# Patient Record
Sex: MALE | Race: WHITE | Employment: UNEMPLOYED | URBAN - NONMETROPOLITAN AREA
[De-identification: names, ages, dates, MRNs, and addresses within clinical notes are randomized per-mention and may not be internally consistent; named-entity substitution may affect disease eponyms.]

---

## 2021-11-30 ENCOUNTER — CLINICAL DOCUMENTATION (OUTPATIENT)
Dept: INTERNAL MEDICINE CLINIC | Age: 31
End: 2021-11-30

## 2021-11-30 ENCOUNTER — OFFICE VISIT (OUTPATIENT)
Dept: INTERNAL MEDICINE CLINIC | Age: 31
End: 2021-11-30

## 2021-11-30 VITALS
DIASTOLIC BLOOD PRESSURE: 82 MMHG | HEART RATE: 91 BPM | SYSTOLIC BLOOD PRESSURE: 137 MMHG | TEMPERATURE: 97.2 F | WEIGHT: 151 LBS

## 2021-11-30 DIAGNOSIS — F11.20 SEVERE OPIOID USE DISORDER (HCC): Primary | ICD-10-CM

## 2021-11-30 DIAGNOSIS — F11.93 OPIOID WITHDRAWAL (HCC): ICD-10-CM

## 2021-11-30 DIAGNOSIS — F19.10 POLYSUBSTANCE ABUSE (HCC): ICD-10-CM

## 2021-11-30 LAB
ALCOHOL URINE: ABNORMAL
AMPHETAMINE SCREEN, URINE: ABNORMAL
BARBITURATE SCREEN, URINE: ABNORMAL
BENZODIAZEPINE SCREEN, URINE: ABNORMAL
BUPRENORPHINE URINE: ABNORMAL
COCAINE METABOLITE SCREEN URINE: ABNORMAL
FENTANYL SCREEN, URINE: ABNORMAL
GABAPENTIN SCREEN, URINE: ABNORMAL
MDMA URINE: ABNORMAL
METHADONE SCREEN, URINE: ABNORMAL
METHAMPHETAMINE, URINE: ABNORMAL
OPIATE SCREEN URINE: ABNORMAL
OXYCODONE SCREEN URINE: ABNORMAL
PHENCYCLIDINE SCREEN URINE: ABNORMAL
PROPOXYPHENE SCREEN, URINE: ABNORMAL
SYNTHETIC CANNABINOIDS(K2) SCREEN, URINE: ABNORMAL
THC SCREEN, URINE: ABNORMAL
TRAMADOL SCREEN URINE: ABNORMAL
TRICYCLIC ANTIDEPRESSANTS, UR: ABNORMAL

## 2021-11-30 PROCEDURE — 99205 OFFICE O/P NEW HI 60 MIN: CPT | Performed by: INTERNAL MEDICINE

## 2021-11-30 PROCEDURE — 80305 DRUG TEST PRSMV DIR OPT OBS: CPT | Performed by: INTERNAL MEDICINE

## 2021-11-30 RX ORDER — BUPRENORPHINE AND NALOXONE 8; 2 MG/1; MG/1
1 FILM, SOLUBLE BUCCAL; SUBLINGUAL ONCE
Status: COMPLETED | OUTPATIENT
Start: 2021-11-30 | End: 2021-11-30

## 2021-11-30 RX ADMIN — BUPRENORPHINE AND NALOXONE 1 FILM: 8; 2 FILM, SOLUBLE BUCCAL; SUBLINGUAL at 10:42

## 2021-11-30 NOTE — PROGRESS NOTES
Clinician engaged with pt and reviewed OBOT clinic expectations for program participants. Clinician explained that upon starting with the program the patient will be expected to engage in individual and group counseling to enhance their recovery skills. Clinician reviewed the treatment menu with patient and identified local providers that they are comfortable seeing for counseling services. Provided pt with information about accessing services at their agency of choice. Clinician explained that upon completing their first counseling visit they need to bring a copy of a signed IBETH that allows the counselor to release attendance records to the clinic and a copy of their treatment plan. Clinician explained that they need to bring a signed form verifying their attendance after each session with their counselor so that it can be uploaded into their chart. It was explained to the pt that they need to attend either treatment groups at a local agency where they receive counseling or attend a minimum of two community support groups per week and would need to bring a signed verification form. Pt was provided with a list of local 12 step meetings and the attendance verification form. Clinician explained the drug screen policy and informed them that their provider may request that they come in for a random drug screen or medication count. In the event that they are called for a random check, they will have 24hrs to come into the office. In the event that pt is not compliant with program expectations the frequency of their visits may be increased for added accountability, they may be asked to participate in a higher level of care, or they may be terminated from the treatment program.     Clinician allowed time for pt questions and had them sign the program expectations form and clinician signed as the witness.  A copy was made for patient to present to the counselor that they schedule with so that the pt can get the necessary documentation for the clinic. The form was given to support staff to be scanned into the pt chart. Patient met with Sw today. Patient identifies that he does have his own apartment in [place]. Patient reports having a job lined up and will start next week. Patient reports that he does prefer at this time to stay with Dr. Jatin Bennett. Patient is interested in the [employer] danelle and will bring phone with him at next appointment to set up. Sw offered to put a list of AA NA meetings in his area together for him along with counseling options. Patient reports that he will be able to obtain insurance through work in 80 days. Sw will meet with patient at next visit.

## 2021-11-30 NOTE — PROGRESS NOTES
MEDICATION ASSISTED TREATMENT ENCOUNTER    HISTORY OF PRESENT ILLNESS  Patient presents for evaluation of opioid use disorder and wants to be placed on medication assisted treatment  Patient found us online  Patient has had problems using heroin and fentanyl  Patient started using heroin and fentanyl 11 years ago  He started getting pain pills done in [place] then progressed to heroin  Patient uses it intravenous  Other drugs used: Meth, THC  Suboxone programs in the past he was in one in [facility] and one in [place]  He was clean for about 2 and half years combined but he did relapse in between  Vivitrol in the past for about a month  Last use of heroin about a year ago in group home  He went to group home December 2019 just got out 3 days ago he said he was using Suboxone in group home  About 2 days ago he took Percocet ,handful of them  Last use of Suboxone was in group home November 26  Patient would typically use up to 3 g daily fentanyl    Have you ever overdosed 4 times, one time he had to be given Narcan  No past medical history on file. No past surgical history on file. PAST PSYCHIATRIC HISTORY: Social anxiety, depression    No Known Allergies    No current outpatient medications on file. No current facility-administered medications for this visit. 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     Mental status examination    Cognition: oriented to person place and time      Appearance: Patient is in no acute distress, normal appearance, patient does not appear intoxicated/    Memory: Normal    Behavior/motor: Normal    Mood: Good mood, upbeat    Affect: Mood congruent    Attitude toward examiner: Pleasant, respectful    Thought content no delusions hallucinations suicidal ideation    Insight: poor    Judgment: poor    Eyes: Pupils normal    Skin: No track marks noted ,no rashes noted    COWS score: 13                      URINE DRUG SCREEN TODAY:  Recent Labs     11/30/21  0921   ALCOHOL NEG   LABAMPH POS   LABBARB NEG   LABBENZ NEG   BUPRENUR POS   COCAIMETSCRU NEG   FENTSCRUR POS   GABAPENTIN N/A   MDMA NEG   METAMPU POS   LABMETH NEG   OPIATESCREENURINE NEG   OXTCOSU POS   PHENCYCLIDINESCREENURINE NEG   PROPOXYPHENE N/A   SPICEUR NEG   THCSCREENUR NEG   TRAMADOLUR NEG   TRICYUR N/A           Diagnosis Orders   1. Severe opioid use disorder (HCC)  POCT Rapid Drug Screen    buprenorphine-naloxone (SUBOXONE) 8-2 MG SL film 1 Film   2. Polysubstance abuse ([facility]     3. [facility]io[facility] withdrawal ([facility]           PLAN:  Provider provided education on Medication Assisted Treatment options, including: Suboxone, Sublocade, Methadone, and Naltrexone/Vivitrol  Allowed opportunity to respond to questions regarding the treatment options. Patient voices that their treatment preference is suboxone. I feel that this patient is an appropriate candidate for this treatment option. Education given on the importance of combining Medication Assisted Treatment with comprehensive treatment, including: individual counseling, treatment groups, community support groups, and psychiatry as applicable. Patient will meet with  to review clinic counseling expectations and to be linked to appropriate services. Provider reviewed medication contract with patient. Patient is agreeable to the program expectations. Both patient and provider signed the medication contract.      Patient instructed to go to [facility] to watch a video and learn about [facility]. I told patient [facility] is an opioid antagonist that reverses respiratory depression caused by opioids. Pharmacy will give patient or family member [facility] and explain how to use in an emergency.   I reviewed the [place] Automated Rx Reporting System report     There does not appear to be any discrepancies or overprescribing of controlled substances  Urine has multiple substances including fentanyl  He is in moderate withdrawal  I think it is safe to do a Suboxone induction here in the office  Patient was given 8 mg Suboxone monitored for 20 minutes  There is no precipitated withdrawal  I am going to have the patient see Lamont Puga about counseling and the recovery danelle reset  We will give him 2-8 mg strips out of stock of Suboxone  He will come back here tomorrow      Total visit time: 45 minutes

## 2021-11-30 NOTE — PROGRESS NOTES
Verbal order per Dr. Dafne Baxter for urine drug screen. Positive for BUP AMP METH FTY OXY. Verified results with Josselin Crenshaw. Dr. Dafne Baxter ordered Suboxone 8mg film qty 1 for induction purpose- no adverse reactions noted for patient. Verified dose with patient. Patient was sent home with Suboxone 8mg film qty 2 out of stock and will be seen back in the office 12/1/21.

## 2021-11-30 NOTE — PROGRESS NOTES
Patient contacted office  On 11/29/2021  to complete new patient screening. Patient is currently living in [place] though currently has [place]. Patient identifies that he is unable to remain clean for 30 days without MAT treatment. Patient reports having reliable transportation. Sw staffed case with Dr. Agustina Hale. Sw will meet with patient to assist while in office with setting up services closer to him. Patient scheduled for 11/30/2021.

## 2021-12-01 ENCOUNTER — OFFICE VISIT (OUTPATIENT)
Dept: INTERNAL MEDICINE CLINIC | Age: 31
End: 2021-12-01

## 2021-12-01 VITALS
SYSTOLIC BLOOD PRESSURE: 142 MMHG | DIASTOLIC BLOOD PRESSURE: 81 MMHG | WEIGHT: 152 LBS | TEMPERATURE: 98.1 F | HEART RATE: 73 BPM

## 2021-12-01 DIAGNOSIS — Z79.899 ENCOUNTER FOR MONITORING SUBOXONE MAINTENANCE THERAPY: ICD-10-CM

## 2021-12-01 DIAGNOSIS — Z51.81 ENCOUNTER FOR MONITORING SUBOXONE MAINTENANCE THERAPY: ICD-10-CM

## 2021-12-01 DIAGNOSIS — F11.20 SEVERE OPIOID USE DISORDER (HCC): Primary | ICD-10-CM

## 2021-12-01 DIAGNOSIS — F19.10 POLYSUBSTANCE ABUSE (HCC): ICD-10-CM

## 2021-12-01 PROCEDURE — 80305 DRUG TEST PRSMV DIR OPT OBS: CPT | Performed by: INTERNAL MEDICINE

## 2021-12-01 PROCEDURE — 99214 OFFICE O/P EST MOD 30 MIN: CPT | Performed by: INTERNAL MEDICINE

## 2021-12-01 RX ORDER — BUPRENORPHINE AND NALOXONE 8; 2 MG/1; MG/1
1 FILM, SOLUBLE BUCCAL; SUBLINGUAL 2 TIMES DAILY
Qty: 10 FILM | Refills: 0 | Status: SHIPPED | OUTPATIENT
Start: 2021-12-01 | End: 2021-12-06 | Stop reason: SDUPTHER

## 2021-12-01 RX ORDER — BUPRENORPHINE AND NALOXONE 8; 2 MG/1; MG/1
1 FILM, SOLUBLE BUCCAL; SUBLINGUAL 2 TIMES DAILY
COMMUNITY
End: 2021-12-01 | Stop reason: SDUPTHER

## 2021-12-01 NOTE — PROGRESS NOTES
Verbal order per Dr. Jaqueline Latham for urine drug screen. Positive for BUP METH OXY. Verified results with Armaan Fitzgerald. Dr. Jaqueline Latham ordered Suboxone 8mg film BID for patient. Verified dose with patient. Patient was sent home with 5 day script of Suboxone 8mg film BID and will be seen back in the office 12/6/21.

## 2021-12-01 NOTE — PROGRESS NOTES
MEDICATION ASSISTED TREATMENT ENCOUNTER    HISTORY OF PRESENT ILLNESS  Patient presents for evaluation of opioid use disorder and wants to be placed on medication assisted treatment  Patient found us online  I saw him here for the first time 11/30  We did a Suboxone induction here in the office  He was given 8 mg and monitored for 20 minutes  There were no complications, no precipitated withdrawal  I gave him 2-8 mg Suboxone to go home with  He used 1 last night 1 this morning  He says he feels much better  Pertinent drug history  Patient has had problems using heroin and fentanyl  Patient started using heroin and fentanyl 11 years ago  He started getting pain pills done in [facility] then progressed to heroin  Patient uses it intravenous  Other drugs used: Meth, THC  Suboxone programs in the past he was in one in [facility] and one in [facility]  He was clean for about 2 and half years combined but he did relapse in between  Vivitrol in the past for about a month  Last use of heroin about a year ago in detention  He went to detention December 2019 just got out 3 days ago he said he was using Suboxone in detention  About 4 days ago he took Percocet ,handful of them  Last use of Suboxone was in detention November 26  Patient would typically use up to 3 g daily fentanyl    Have you ever overdosed 4 times, one time he had to be given Narcan  No past medical history on file. No past surgical history on file. PAST PSYCHIATRIC HISTORY: Social anxiety, depression    No Known Allergies    Current Outpatient Medications   Medication Sig Dispense Refill    buprenorphine-naloxone (SUBOXONE) 8-2 MG FILM SL film Place 1 Film under the tongue 2 times daily for 5 days. 10 Film 0     No current facility-administered medications for this visit.          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 Mental status examination    Cognition: oriented to person place and time      Appearance: Patient is in no acute distress, normal appearance, patient does not appear intoxicated/    Memory: Normal    Behavior/motor: Normal    Mood: Good mood, upbeat    Affect: Mood congruent    Attitude toward examiner: Pleasant, respectful    Thought content no delusions hallucinations suicidal ideation    Insight: poor    Judgment: poor    Eyes: Pupils normal    Skin: No track marks noted ,no rashes noted                          URINE DRUG SCREEN TODAY:  Recent Labs     12/01/21  0943   ALCOHOL NEG   LABAMPH NEG   LABBARB NEG   LABBENZ NEG   BUPRENUR POS   COCAIMETSCRU NEG   FENTSCRUR NEG   GABAPENTIN N/A   MDMA NEG   METAMPU POS   LABMETH NEG   OPIATESCREENURINE NEG   OXTCOSU POS   PHENCYCLIDINESCREENURINE NEG   PROPOXYPHENE N/A   SPICEUR NEG   THCSCREENUR NEG   TRAMADOLUR NEG   TRICYUR N/A           Diagnosis Orders   1. Severe opioid use disorder (HCC)  POCT Rapid Drug Screen    buprenorphine-naloxone (SUBOXONE) 8-2 MG FILM SL film   2. Polysubstance abuse ([facility]     3.  Encounter for monitoring Suboxone maintenance therapy           PLAN:  Urine still has meth, OxyContin  Patient says he feels much better  I think 8 mg twice daily Suboxone will be his maintenance dose  I am going to see him back on 12/6  I discussed a new phone danelle dealing with substance use disorder with the patient  It is called reSet  It is a 24/7 hour system that the patient can use at any time  There are lesson plans, tracking of treatment, rewards, etc  I told the patient's I will also have access to their account to track their progress  Patient will discuss with Shaun Adan and sign up if interested  She will also discussed counseling and meetings with the patient  Need to check hep C status has he used IV drugs

## 2021-12-01 NOTE — PROGRESS NOTES
Patient signed up for ReSET-O today. Refill request per protocol.   Rx(s) sent via e-prescribing to the pharmacy.

## 2021-12-06 ENCOUNTER — OFFICE VISIT (OUTPATIENT)
Dept: INTERNAL MEDICINE CLINIC | Age: 31
End: 2021-12-06

## 2021-12-06 VITALS
SYSTOLIC BLOOD PRESSURE: 134 MMHG | HEART RATE: 115 BPM | TEMPERATURE: 97.1 F | WEIGHT: 153 LBS | DIASTOLIC BLOOD PRESSURE: 85 MMHG

## 2021-12-06 DIAGNOSIS — Z79.899 ENCOUNTER FOR MONITORING SUBOXONE MAINTENANCE THERAPY: ICD-10-CM

## 2021-12-06 DIAGNOSIS — F19.10 POLYSUBSTANCE ABUSE (HCC): ICD-10-CM

## 2021-12-06 DIAGNOSIS — Z51.81 ENCOUNTER FOR MONITORING SUBOXONE MAINTENANCE THERAPY: ICD-10-CM

## 2021-12-06 DIAGNOSIS — F11.20 SEVERE OPIOID USE DISORDER (HCC): Primary | ICD-10-CM

## 2021-12-06 PROCEDURE — 80305 DRUG TEST PRSMV DIR OPT OBS: CPT | Performed by: INTERNAL MEDICINE

## 2021-12-06 PROCEDURE — 99214 OFFICE O/P EST MOD 30 MIN: CPT | Performed by: INTERNAL MEDICINE

## 2021-12-06 RX ORDER — BUPRENORPHINE AND NALOXONE 8; 2 MG/1; MG/1
1 FILM, SOLUBLE BUCCAL; SUBLINGUAL 2 TIMES DAILY
Qty: 14 FILM | Refills: 0 | Status: SHIPPED | OUTPATIENT
Start: 2021-12-06 | End: 2021-12-13 | Stop reason: SDUPTHER

## 2021-12-06 NOTE — PROGRESS NOTES
Verbal order per Dr. Severiano Spence for urine drug screen. Positive for BUP AMP METH MDMA FTY THC . Verified results with Nicolás Grounds. Dr. Severiano Spence ordered Suboxone 8mg film BID  for patient. Verified dose with patient. Patient was sent home with 1 week script of Suboxone 8mg film BID and will be seen back in the office 12/13/21. UC and oral swab sent.

## 2021-12-06 NOTE — PROGRESS NOTES
MEDICATION ASSISTED TREATMENT ENCOUNTER    HISTORY OF PRESENT ILLNESS  Patient presents for evaluation of opioid use disorder and wants to be placed on medication assisted treatment  Patient found us online  I saw him here for the first time 12/1  Patient said a couple nights he took extra he only had a half of a strip left for this morning  He did not relapse  We did a Suboxone induction here in the office 11/30    Pertinent drug history  Patient has had problems using heroin and fentanyl  Patient started using heroin and fentanyl 11 years ago  He started getting pain pills done in [place] then progressed to heroin  Patient uses it intravenous  Other drugs used: Meth, THC  Suboxone programs in the past he was in one in [facility] and one in [place]  He was clean for about 2 and half years combined but he did relapse in between  Vivitrol in the past for about a month  Last use of heroin about a year ago in nursing home  He went to nursing home December 2019 just got out 3 days ago he said he was using Suboxone in nursing home  About 4 days ago he took Percocet ,handful of them  Last use of Suboxone was in nursing home November 26  Patient would typically use up to 3 g daily fentanyl    Have you ever overdosed 4 times, one time he had to be given Narcan  No past medical history on file. No past surgical history on file. PAST PSYCHIATRIC HISTORY: Social anxiety, depression    No Known Allergies    Current Outpatient Medications   Medication Sig Dispense Refill    buprenorphine-naloxone (SUBOXONE) 8-2 MG FILM SL film Place 1 Film under the tongue 2 times daily for 7 days. 14 Film 0     No current facility-administered medications for this visit.          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 Mental status examination    Cognition: oriented to person place and time      Appearance: Patient is in no acute distress, normal appearance, patient does not appear intoxicated/    Memory: Normal    Behavior/motor: Normal    Mood: Good mood, upbeat    Affect: Mood congruent    Attitude toward examiner: Pleasant, respectful    Thought content no delusions hallucinations suicidal ideation    Insight: poor    Judgment: poor    Eyes: Pupils normal    Skin: No track marks noted ,no rashes noted                          URINE DRUG SCREEN TODAY:    Component 12/6/21 1411   Alcohol, Urine NEG    Amphetamine Screen, Urine POS    Barbiturate Screen, Urine NEG    Benzodiazepine Screen, Urine NEG    Buprenorphine Urine POS    Cocaine Metabolite Screen, Urine NEG    FENTANYL SCREEN, URINE POS    Gabapentin Screen, Urine N/A    MDMA, Urine POS    Methadone Screen, Urine NEG    Methamphetamine, Urine POS    Opiate Scrn, Ur NEG    Oxycodone Screen, Ur NEG    PCP Screen, Urine NEG    Propoxyphene Screen, Urine N/A    Synthetic Cannabinoids (K2) Screen, Urine NEG    THC Screen, Urine POS    Tramadol Scrn, Ur NEG    Tricyclic Antidepressants, Urine N/A         Diagnosis Orders   1. Severe opioid use disorder (HCC)  buprenorphine-naloxone (SUBOXONE) 8-2 MG FILM SL film    POCT Rapid Drug Screen   2. Polysubstance abuse ([facility] [address]     3.  Encounter for monitoring Suboxone maintenance therapy           PLAN:  Urine still has meth, fentanyl and amphetamines  Patient says he feels much better  I think 8 mg twice daily Suboxone will be his maintenance dose  I am going to see him back in 1 week  I    Need to check hep C status as he used IV drugs  I reviewed the [place] Automated [facility] Reporting [facility] report     There does not appear to be any discrepancies or overprescribing of controlled substances

## 2021-12-13 ENCOUNTER — OFFICE VISIT (OUTPATIENT)
Dept: INTERNAL MEDICINE CLINIC | Age: 31
End: 2021-12-13

## 2021-12-13 VITALS
WEIGHT: 160 LBS | DIASTOLIC BLOOD PRESSURE: 85 MMHG | SYSTOLIC BLOOD PRESSURE: 141 MMHG | TEMPERATURE: 97.3 F | HEART RATE: 93 BPM | HEIGHT: 71 IN | BODY MASS INDEX: 22.4 KG/M2

## 2021-12-13 DIAGNOSIS — B18.2 HEP C W/O COMA, CHRONIC (HCC): ICD-10-CM

## 2021-12-13 DIAGNOSIS — Z79.899 ENCOUNTER FOR MONITORING SUBOXONE MAINTENANCE THERAPY: ICD-10-CM

## 2021-12-13 DIAGNOSIS — F19.10 POLYSUBSTANCE ABUSE (HCC): ICD-10-CM

## 2021-12-13 DIAGNOSIS — Z51.81 ENCOUNTER FOR MONITORING SUBOXONE MAINTENANCE THERAPY: ICD-10-CM

## 2021-12-13 DIAGNOSIS — F11.20 SEVERE OPIOID USE DISORDER (HCC): Primary | ICD-10-CM

## 2021-12-13 PROCEDURE — 99214 OFFICE O/P EST MOD 30 MIN: CPT | Performed by: INTERNAL MEDICINE

## 2021-12-13 PROCEDURE — 80305 DRUG TEST PRSMV DIR OPT OBS: CPT | Performed by: INTERNAL MEDICINE

## 2021-12-13 RX ORDER — BUPRENORPHINE AND NALOXONE 8; 2 MG/1; MG/1
1 FILM, SOLUBLE BUCCAL; SUBLINGUAL 2 TIMES DAILY
Qty: 14 FILM | Refills: 0 | Status: SHIPPED | OUTPATIENT
Start: 2021-12-13 | End: 2021-12-20 | Stop reason: SDUPTHER

## 2021-12-13 NOTE — PROGRESS NOTES
MEDICATION ASSISTED TREATMENT ENCOUNTER    HISTORY OF PRESENT ILLNESS  Patient presents for evaluation of opioid use disorder and wants to be placed on medication assisted treatment  Patient found us online  I saw him here for the first time 12/6    We did a Suboxone induction here in the office 11/30  He is on Suboxone 16 mg a day doing well  Pertinent drug history  Patient has had problems using heroin and fentanyl  Patient started using heroin and fentanyl 11 years ago  He started getting pain pills done in [place] then progressed to heroin  Patient uses it intravenous  Other drugs used: Meth, THC  Suboxone programs in the past he was in one in [facility] and one in [place]  He was clean for about 2 and half years combined but he did relapse in between  Vivitrol in the past for about a month  Last use of heroin about a year ago in intermediate  He went to intermediate December 2019 just got out 3 days ago he said he was using Suboxone in intermediate  About 4 days ago he took Percocet ,handful of them  Last use of Suboxone was in intermediate November 26  Patient would typically use up to 3 g daily fentanyl    Have you ever overdosed 4 times, one time he had to be given Narcan  No past medical history on file. No past surgical history on file. PAST PSYCHIATRIC HISTORY: Social anxiety, depression    No Known Allergies    Current Outpatient Medications   Medication Sig Dispense Refill    buprenorphine-naloxone (SUBOXONE) 8-2 MG FILM SL film Place 1 Film under the tongue 2 times daily for 7 days. 14 Film 0     No current facility-administered medications for this visit.          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 Mental status examination    Cognition: oriented to person place and time      Appearance: Patient is in no acute distress, normal appearance, patient does not appear intoxicated/    Memory: Normal    Behavior/motor: Normal    Mood: Good mood, upbeat    Affect: Mood congruent    Attitude toward examiner: Pleasant, respectful    Thought content no delusions hallucinations suicidal ideation    Insight: poor    Judgment: poor    Eyes: Pupils normal    Skin: No track marks noted ,no rashes noted                          URINE DRUG SCREEN TODAY:  Component 12/13/21 1238   Alcohol, Urine NEG    Amphetamine Screen, Urine NEG    Barbiturate Screen, Urine NEG    Benzodiazepine Screen, Urine NEG    Buprenorphine Urine POS    Cocaine Metabolite Screen, Urine NEG    FENTANYL SCREEN, URINE NEG    Gabapentin Screen, Urine N/A    MDMA, Urine NEG    Methadone Screen, Urine NEG    Methamphetamine, Urine POS    Opiate Scrn, Ur NEG    Oxycodone Screen, Ur NEG    PCP Screen, Urine NEG    Propoxyphene Screen, Urine N/A    Synthetic Cannabinoids (K2) Screen, Urine NEG    THC Screen, Urine POS    Tramadol Scrn, Ur NEG    Tricyclic Antidepressants, Urine N/A           Diagnosis Orders   1. Severe opioid use disorder (HCC)  POCT Rapid Drug Screen    buprenorphine-naloxone (SUBOXONE) 8-2 MG FILM SL film   2. Polysubstance abuse ([facility]     3. Encounter for monitoring Suboxone maintenance therapy     4.  Hep C w/o coma, chronic (HCC)           PLAN:  Urine still has meth, fentanyl and amphetamines are gone  Patient says he feels much better  I think 8 mg twice daily Suboxone will be his maintenance dose  I am going to see him back in 1 week  I  Patient has hep C he is waiting to get insurance coverage before he starts treatment  This should be within 90 days  I reviewed the [facility] report     There does not appear to be any discrepancies or overprescribing of controlled substances  Patient signed up for the recovery danelle, Reset

## 2021-12-15 NOTE — PROGRESS NOTES
Verbal order per Dr Belem Ramirez for urine drug screen. Positive for BUP, METH,THC Verified results with Lelia Saenz. Dr Belem Ramirez ordered patient Suboxone 8 mg film BID. Verified dose with patient. Patient was sent home with 1 week script of Suboxone 8 mg film BID and will be seen back in office on 12/20/21.

## 2021-12-20 ENCOUNTER — OFFICE VISIT (OUTPATIENT)
Dept: INTERNAL MEDICINE CLINIC | Age: 31
End: 2021-12-20

## 2021-12-20 VITALS
TEMPERATURE: 98.7 F | SYSTOLIC BLOOD PRESSURE: 93 MMHG | BODY MASS INDEX: 22.82 KG/M2 | HEIGHT: 71 IN | HEART RATE: 93 BPM | WEIGHT: 163 LBS | DIASTOLIC BLOOD PRESSURE: 60 MMHG

## 2021-12-20 DIAGNOSIS — B18.2 HEP C W/O COMA, CHRONIC (HCC): ICD-10-CM

## 2021-12-20 DIAGNOSIS — Z79.899 ENCOUNTER FOR MONITORING SUBOXONE MAINTENANCE THERAPY: ICD-10-CM

## 2021-12-20 DIAGNOSIS — F19.10 POLYSUBSTANCE ABUSE (HCC): ICD-10-CM

## 2021-12-20 DIAGNOSIS — F11.20 SEVERE OPIOID USE DISORDER (HCC): Primary | ICD-10-CM

## 2021-12-20 DIAGNOSIS — Z51.81 ENCOUNTER FOR MONITORING SUBOXONE MAINTENANCE THERAPY: ICD-10-CM

## 2021-12-20 PROCEDURE — 99214 OFFICE O/P EST MOD 30 MIN: CPT | Performed by: INTERNAL MEDICINE

## 2021-12-20 PROCEDURE — 80305 DRUG TEST PRSMV DIR OPT OBS: CPT | Performed by: INTERNAL MEDICINE

## 2021-12-20 RX ORDER — BUPRENORPHINE AND NALOXONE 8; 2 MG/1; MG/1
1 FILM, SOLUBLE BUCCAL; SUBLINGUAL 2 TIMES DAILY
Qty: 28 FILM | Refills: 0 | Status: SHIPPED | OUTPATIENT
Start: 2021-12-20 | End: 2022-01-03

## 2021-12-20 NOTE — PROGRESS NOTES
Verbal order per Dr. Tyesha Mixon for urine drug screen. Positive for BUP THC. Verified results with Marina Todd. Dr. Tyesha Mixon ordered Suboxone 8mg film BID  for patient. Verified dose with patient. Patient was sent home with 2 week script of Suboxone 8mg film BID and will be seen back in the office 1/3/22.

## 2021-12-20 NOTE — PROGRESS NOTES
MEDICATION ASSISTED TREATMENT ENCOUNTER    HISTORY OF PRESENT ILLNESS  Patient presents for evaluation of opioid use disorder and wants to be placed on medication assisted treatment  Patient found us online  I saw him here for the first time 11/30  I last saw him 12/13  We did a Suboxone induction here in the office 11/30  He is on Suboxone 16 mg a day doing well  Pertinent drug history  Patient has had problems using heroin and fentanyl  Patient started using heroin and fentanyl 11 years ago  He started getting pain pills done in [place] then progressed to heroin  Patient uses it intravenous  Other drugs used: Meth, THC  Suboxone programs in the past he was in one in [facility] and one in [place]  He was clean for about 2 and half years combined but he did relapse in between  Vivitrol in the past for about a month  Last use of heroin about a year ago in intermediate  He went to intermediate December 2019 just got out 3 days ago he said he was using Suboxone in intermediate  About 4 days ago he took Percocet ,handful of them  Last use of Suboxone was in intermediate November 26  Patient would typically use up to 3 g daily fentanyl    Have you ever overdosed 4 times, one time he had to be given Narcan  No past medical history on file. No past surgical history on file. PAST PSYCHIATRIC HISTORY: Social anxiety, depression    No Known Allergies    Current Outpatient Medications   Medication Sig Dispense Refill    buprenorphine-naloxone (SUBOXONE) 8-2 MG FILM SL film Place 1 Film under the tongue 2 times daily for 14 days. 28 Film 0     No current facility-administered medications for this visit.          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 Mental status examination    Cognition: oriented to person place and time      Appearance: Patient is in no acute distress, normal appearance, patient does not appear intoxicated/    Memory: Normal    Behavior/motor: Normal    Mood: Good mood, upbeat    Affect: Mood congruent    Attitude toward examiner: Pleasant, respectful    Thought content no delusions hallucinations suicidal ideation    Insight: poor    Judgment: poor    Eyes: Pupils normal    Skin: No track marks noted ,no rashes noted                          URINE DRUG SCREEN TODAY:  Component 12/20/21 1302   Alcohol, Urine NEG    Amphetamine Screen, Urine NEG    Barbiturate Screen, Urine NEG    Benzodiazepine Screen, Urine NEG    Buprenorphine Urine POS    Cocaine Metabolite Screen, Urine NEG    FENTANYL SCREEN, URINE NEG    Gabapentin Screen, Urine N/A    MDMA, Urine NEG    Methadone Screen, Urine NEG    Methamphetamine, Urine NEG    Opiate Scrn, Ur NEG    Oxycodone Screen, Ur NEG    PCP Screen, Urine NEG    Propoxyphene Screen, Urine N/A    Synthetic Cannabinoids (K2) Screen, Urine NEG    THC Screen, Urine POS    Tramadol Scrn, Ur NEG    Tricyclic Antidepressants, Urine N/A         Diagnosis Orders   1. Severe opioid use disorder (HCC)  POCT Rapid Drug Screen    buprenorphine-naloxone (SUBOXONE) 8-2 MG FILM SL film   2. Polysubstance abuse ([facility] [address]     3. Encounter for monitoring Suboxone maintenance therapy     4.  Hep C w/o coma, chronic (HCC)           PLAN:  Urine has no meth, Patient says he feels much better  I think 8 mg twice daily Suboxone will be his maintenance dose  I am going to see him back in 2 weeks  I  Patient has hep C he is waiting to get insurance coverage before he starts treatment  This should be within 90 days  I reviewed the [facility] report     There does not appear to be any discrepancies or overprescribing of controlled substances  Patient signed up for the recovery danelle, Reset

## 2022-01-03 ENCOUNTER — OFFICE VISIT (OUTPATIENT)
Dept: INTERNAL MEDICINE CLINIC | Age: 32
End: 2022-01-03

## 2022-01-03 VITALS
RESPIRATION RATE: 20 BRPM | HEART RATE: 72 BPM | TEMPERATURE: 98.2 F | WEIGHT: 163.4 LBS | HEIGHT: 71 IN | BODY MASS INDEX: 22.88 KG/M2 | DIASTOLIC BLOOD PRESSURE: 71 MMHG | SYSTOLIC BLOOD PRESSURE: 120 MMHG

## 2022-01-03 DIAGNOSIS — F11.20 SEVERE OPIOID USE DISORDER (HCC): Primary | ICD-10-CM

## 2022-01-03 DIAGNOSIS — B18.2 HEP C W/O COMA, CHRONIC (HCC): ICD-10-CM

## 2022-01-03 DIAGNOSIS — F19.10 POLYSUBSTANCE ABUSE (HCC): ICD-10-CM

## 2022-01-03 DIAGNOSIS — Z51.81 ENCOUNTER FOR MONITORING SUBOXONE MAINTENANCE THERAPY: ICD-10-CM

## 2022-01-03 DIAGNOSIS — Z79.899 ENCOUNTER FOR MONITORING SUBOXONE MAINTENANCE THERAPY: ICD-10-CM

## 2022-01-03 PROCEDURE — 99214 OFFICE O/P EST MOD 30 MIN: CPT | Performed by: INTERNAL MEDICINE

## 2022-01-03 PROCEDURE — 80305 DRUG TEST PRSMV DIR OPT OBS: CPT | Performed by: INTERNAL MEDICINE

## 2022-01-03 RX ORDER — BUPRENORPHINE AND NALOXONE 8; 2 MG/1; MG/1
1 FILM, SOLUBLE BUCCAL; SUBLINGUAL 2 TIMES DAILY
Qty: 28 FILM | Refills: 0 | Status: SHIPPED | OUTPATIENT
Start: 2022-01-03 | End: 2022-01-17 | Stop reason: SDUPTHER

## 2022-01-03 NOTE — PROGRESS NOTES
MEDICATION ASSISTED TREATMENT ENCOUNTER    HISTORY OF PRESENT ILLNESS  Patient presents for evaluation of opioid use disorder and wants to be placed on medication assisted treatment  Patient found us online  I saw him here for the first time 11/30  I last saw him 12/20  We did a Suboxone induction here in the office 11/30  He is on Suboxone 16 mg a day doing well  Pertinent drug history  Patient has had problems using heroin and fentanyl  Patient started using heroin and fentanyl 11 years ago  He started getting pain pills done in [facility] then progressed to heroin  Patient uses it intravenous  Other drugs used: Meth, THC  Suboxone programs in the past he was in one in [facility] and one in [place]  He was clean for about 2 and half years combined but he did relapse in between  Vivitrol in the past for about a month  Last use of heroin about a year ago in penitentiary  He went to penitentiary December 2019 just got out 3 days ago he said he was using Suboxone in penitentiary  About 4 days ago he took Percocet ,handful of them  Last use of Suboxone was in penitentiary November 26  Patient would typically use up to 3 g daily fentanyl    Have you ever overdosed 4 times, one time he had to be given Narcan  No past medical history on file. No past surgical history on file. PAST PSYCHIATRIC HISTORY: Social anxiety, depression    No Known Allergies    Current Outpatient Medications   Medication Sig Dispense Refill    buprenorphine-naloxone (SUBOXONE) 8-2 MG FILM SL film Place 1 Film under the tongue 2 times daily for 14 days. 28 Film 0     No current facility-administered medications for this visit.          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 rate 20, height 5' 11\" (1.803 m), weight 163 lb 6.4 oz (74.1 kg). Mental status examination    Cognition: oriented to person place and time      Appearance: Patient is in no acute distress, normal appearance, patient does not appear intoxicated/    Memory: Normal    Behavior/motor: Normal    Mood: Good mood, upbeat    Affect: Mood congruent    Attitude toward examiner: Pleasant, respectful    Thought content no delusions hallucinations suicidal ideation    Insight: poor    Judgment: poor    Eyes: Pupils normal    Skin: No track marks noted ,no rashes noted                          URINE DRUG SCREEN TODAY:  Component 12/20/21 1302   Alcohol, Urine NEG    Amphetamine Screen, Urine NEG    Barbiturate Screen, Urine NEG    Benzodiazepine Screen, Urine NEG    Buprenorphine Urine POS    Cocaine Metabolite Screen, Urine NEG    FENTANYL SCREEN, URINE NEG    Gabapentin Screen, Urine N/A    MDMA, Urine NEG    Methadone Screen, Urine NEG    Methamphetamine, Urine NEG    Opiate Scrn, Ur NEG    Oxycodone Screen, Ur NEG    PCP Screen, Urine NEG    Propoxyphene Screen, Urine N/A    Synthetic Cannabinoids (K2) Screen, Urine NEG    THC Screen, Urine POS    Tramadol Scrn, Ur NEG    Tricyclic Antidepressants, Urine N/A         Diagnosis Orders   1. Severe opioid use disorder (HCC)  POCT Rapid Drug Screen    buprenorphine-naloxone (SUBOXONE) 8-2 MG FILM SL film   2. Polysubstance abuse ([facility] [address]     3. Encounter for monitoring Suboxone maintenance therapy     4.  Hep C w/o coma, chronic (HCC)           PLAN:  Urine has no meth, Patient says he feels much better  I think 8 mg twice daily Suboxone will be his maintenance dose  I am going to see him back in 2 weeks  I  Patient has hep C he is waiting to get insurance coverage before he starts treatment  This should be within 90 days  I reviewed the [facility] report     There does not appear to be any discrepancies or overprescribing of controlled substances  Patient signed up for the recovery danelle, Reset  There have been issues with patients being able to access our clinic by phone  They had been given the wrong number to call and they are never able to reach anyone  I told the patient to put the number [phone] in their cell phone  That number will ring here directly in the Suboxone clinic  I told patient for non-emergency after hours they can leave message and someone will get back with them  For afterhour emergencies patients are told to call 911 or the call center to speak with the physician on call

## 2022-01-17 ENCOUNTER — OFFICE VISIT (OUTPATIENT)
Dept: INTERNAL MEDICINE CLINIC | Age: 32
End: 2022-01-17

## 2022-01-17 VITALS
HEART RATE: 69 BPM | DIASTOLIC BLOOD PRESSURE: 77 MMHG | WEIGHT: 164.2 LBS | HEIGHT: 71 IN | BODY MASS INDEX: 22.99 KG/M2 | SYSTOLIC BLOOD PRESSURE: 151 MMHG | TEMPERATURE: 98.1 F | RESPIRATION RATE: 20 BRPM

## 2022-01-17 DIAGNOSIS — F19.10 POLYSUBSTANCE ABUSE (HCC): ICD-10-CM

## 2022-01-17 DIAGNOSIS — F11.20 SEVERE OPIOID USE DISORDER (HCC): Primary | ICD-10-CM

## 2022-01-17 DIAGNOSIS — Z51.81 ENCOUNTER FOR MONITORING SUBOXONE MAINTENANCE THERAPY: ICD-10-CM

## 2022-01-17 DIAGNOSIS — B18.2 HEP C W/O COMA, CHRONIC (HCC): ICD-10-CM

## 2022-01-17 DIAGNOSIS — Z79.899 ENCOUNTER FOR MONITORING SUBOXONE MAINTENANCE THERAPY: ICD-10-CM

## 2022-01-17 PROCEDURE — 99214 OFFICE O/P EST MOD 30 MIN: CPT | Performed by: INTERNAL MEDICINE

## 2022-01-17 PROCEDURE — 80305 DRUG TEST PRSMV DIR OPT OBS: CPT | Performed by: INTERNAL MEDICINE

## 2022-01-17 RX ORDER — BUPRENORPHINE AND NALOXONE 8; 2 MG/1; MG/1
1 FILM, SOLUBLE BUCCAL; SUBLINGUAL 2 TIMES DAILY
Qty: 28 FILM | Refills: 0 | Status: SHIPPED | OUTPATIENT
Start: 2022-01-17 | End: 2022-01-31 | Stop reason: SDUPTHER

## 2022-01-17 NOTE — PROGRESS NOTES
MEDICATION ASSISTED TREATMENT ENCOUNTER    HISTORY OF PRESENT ILLNESS  Patient presents for evaluation of opioid use disorder and wants to be placed on medication assisted treatment  Patient found us online  I saw him here for the first time 11/30  I last saw him last 1/3  We did a Suboxone induction here in the office 11/30  He is on Suboxone 16 mg a day doing well  Pertinent drug history  Patient has had problems using heroin and fentanyl  Patient started using heroin and fentanyl 11 years ago  He started getting pain pills done in [place] then progressed to heroin  Patient uses it intravenous  Other drugs used: Meth, THC  Suboxone programs in the past he was in one in [facility] and one in [place]  He was clean for about 2 and half years combined but he did relapse in between  Vivitrol in the past for about a month  Last use of heroin about a year ago in care home  He went to care home December 2019 just got out 3 days ago he said he was using Suboxone in care home  About 4 days ago he took Percocet ,handful of them  Last use of Suboxone was in care home November 26  Patient would typically use up to 3 g daily fentanyl    Have you ever overdosed 4 times, one time he had to be given Narcan  No past medical history on file. No past surgical history on file. PAST PSYCHIATRIC HISTORY: Social anxiety, depression    No Known Allergies    Current Outpatient Medications   Medication Sig Dispense Refill    buprenorphine-naloxone (SUBOXONE) 8-2 MG FILM SL film Place 1 Film under the tongue 2 times daily for 14 days. 28 Film 0     No current facility-administered medications for this visit.          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 rate 20, height 5' 11\" (1.803 m), weight 164 lb 3.2 oz (74.5 kg). Mental status examination    Cognition: oriented to person place and time      Appearance: Patient is in no acute distress, normal appearance, patient does not appear intoxicated/    Memory: Normal    Behavior/motor: Normal    Mood: Good mood, upbeat    Affect: Mood congruent    Attitude toward examiner: Pleasant, respectful    Thought content no delusions hallucinations suicidal ideation    Insight: improving    Judgment: improving    Eyes: Pupils normal    Skin: No track marks noted ,no rashes noted                          URINE DRUG SCREEN TODAY:  lcohol, Urine NEG    Amphetamine Screen, Urine NEG    Barbiturate Screen, Urine NEG    Benzodiazepine Screen, Urine NEG    Buprenorphine Urine POS    Cocaine Metabolite Screen, Urine NEG    FENTANYL SCREEN, URINE NEG    Gabapentin Screen, Urine N/A    MDMA, Urine NEG    Methadone Screen, Urine NEG    Methamphetamine, Urine NEG    Opiate Scrn, Ur NEG    Oxycodone Screen, Ur NEG    PCP Screen, Urine NEG    Propoxyphene Screen, Urine N/A    Synthetic Cannabinoids (K2) Screen, Urine NEG    THC Screen, Urine POS    Tramadol Scrn, Ur NEG    Tricyclic Antidepressants, Urine N/A         Diagnosis Orders   1. Severe opioid use disorder (HCC)  POCT Rapid Drug Screen    buprenorphine-naloxone (SUBOXONE) 8-2 MG FILM SL film   2. Polysubstance abuse ([facility] [address]     3. Hep C w/o coma, chronic (HCC)     4.  Encounter for monitoring Suboxone maintenance therapy           PLAN:  Urine has no meth, Patient says he feels much better  I think 8 mg twice daily Suboxone will be his maintenance dose  I am going to see him back in 2 weeks  I  Patient has hep C he is waiting to get insurance coverage before he starts treatment  This should be within 90 days  I reviewed the [facility] report     There does not appear to be any discrepancies or overprescribing of controlled substances  Patient signed up for the recovery danelle, Reset  I reviewed his usage it looks like he has not done any lessons yet

## 2022-01-17 NOTE — PROGRESS NOTES
Verbal order per Dr. Francois Roberson for urine drug screen. Positive for BUP and THC. [facility] Dr. Francois Roberson ordered Suboxone 8 mg films BID for patient. Verified dose with patient. Patient was sent home with script for Suboxone 8 mg films BID for 14 days and will be seen back in the office 1/31/2022.

## 2022-01-31 ENCOUNTER — OFFICE VISIT (OUTPATIENT)
Dept: INTERNAL MEDICINE CLINIC | Age: 32
End: 2022-01-31

## 2022-01-31 VITALS
DIASTOLIC BLOOD PRESSURE: 69 MMHG | WEIGHT: 165 LBS | BODY MASS INDEX: 23.1 KG/M2 | TEMPERATURE: 97.6 F | HEART RATE: 66 BPM | SYSTOLIC BLOOD PRESSURE: 118 MMHG | HEIGHT: 71 IN

## 2022-01-31 DIAGNOSIS — F11.20 SEVERE OPIOID USE DISORDER (HCC): Primary | ICD-10-CM

## 2022-01-31 DIAGNOSIS — Z79.899 ENCOUNTER FOR MONITORING SUBOXONE MAINTENANCE THERAPY: ICD-10-CM

## 2022-01-31 DIAGNOSIS — B18.2 HEP C W/O COMA, CHRONIC (HCC): ICD-10-CM

## 2022-01-31 DIAGNOSIS — F19.10 POLYSUBSTANCE ABUSE (HCC): ICD-10-CM

## 2022-01-31 DIAGNOSIS — Z51.81 ENCOUNTER FOR MONITORING SUBOXONE MAINTENANCE THERAPY: ICD-10-CM

## 2022-01-31 PROCEDURE — 99214 OFFICE O/P EST MOD 30 MIN: CPT | Performed by: INTERNAL MEDICINE

## 2022-01-31 PROCEDURE — 80305 DRUG TEST PRSMV DIR OPT OBS: CPT | Performed by: INTERNAL MEDICINE

## 2022-01-31 RX ORDER — BUPRENORPHINE AND NALOXONE 4; 1 MG/1; MG/1
1 FILM, SOLUBLE BUCCAL; SUBLINGUAL DAILY
Qty: 14 FILM | Refills: 0 | Status: SHIPPED | OUTPATIENT
Start: 2022-01-31 | End: 2022-02-14

## 2022-01-31 RX ORDER — BUPRENORPHINE AND NALOXONE 8; 2 MG/1; MG/1
1 FILM, SOLUBLE BUCCAL; SUBLINGUAL 2 TIMES DAILY
Qty: 28 FILM | Refills: 0 | Status: SHIPPED | OUTPATIENT
Start: 2022-01-31 | End: 2022-02-14
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
## 2022-01-31 NOTE — PROGRESS NOTES
Verbal order per Dr. Malik Mcmahan for urine drug screen. Positive for BUP and THC. Verified results with Ami Castaneda LPN. Dr. Malik Mcmahan ordered Suboxone 8 mg daily and Suboxone 4 mg daily for patient. Verified dose with patient. Patient was sent home with a script for Suboxone 8 mg films daily and Suboxone 4 mg film daily for 14 days days and will be seen back in the office 02/14/2022.

## 2022-02-21 ENCOUNTER — OFFICE VISIT (OUTPATIENT)
Dept: INTERNAL MEDICINE CLINIC | Age: 32
End: 2022-02-21

## 2022-02-21 VITALS
DIASTOLIC BLOOD PRESSURE: 62 MMHG | BODY MASS INDEX: 23.52 KG/M2 | SYSTOLIC BLOOD PRESSURE: 95 MMHG | HEART RATE: 63 BPM | TEMPERATURE: 96.4 F | WEIGHT: 168 LBS | HEIGHT: 71 IN

## 2022-02-21 DIAGNOSIS — Z51.81 ENCOUNTER FOR MONITORING SUBOXONE MAINTENANCE THERAPY: ICD-10-CM

## 2022-02-21 DIAGNOSIS — Z79.899 ENCOUNTER FOR MONITORING SUBOXONE MAINTENANCE THERAPY: ICD-10-CM

## 2022-02-21 DIAGNOSIS — B18.2 HEP C W/O COMA, CHRONIC (HCC): ICD-10-CM

## 2022-02-21 DIAGNOSIS — F11.20 SEVERE OPIOID USE DISORDER (HCC): Primary | ICD-10-CM

## 2022-02-21 DIAGNOSIS — F19.10 POLYSUBSTANCE ABUSE (HCC): ICD-10-CM

## 2022-02-21 PROCEDURE — 80305 DRUG TEST PRSMV DIR OPT OBS: CPT | Performed by: INTERNAL MEDICINE

## 2022-02-21 PROCEDURE — 99214 OFFICE O/P EST MOD 30 MIN: CPT | Performed by: INTERNAL MEDICINE

## 2022-02-21 RX ORDER — BUPRENORPHINE AND NALOXONE 8; 2 MG/1; MG/1
1 FILM, SOLUBLE BUCCAL; SUBLINGUAL 2 TIMES DAILY
Qty: 28 FILM | Refills: 0 | Status: SHIPPED | OUTPATIENT
Start: 2022-02-21 | End: 2022-03-07

## 2022-02-21 RX ORDER — BUPRENORPHINE AND NALOXONE 4; 1 MG/1; MG/1
1 FILM, SOLUBLE BUCCAL; SUBLINGUAL DAILY
Qty: 14 FILM | Refills: 0 | Status: SHIPPED | OUTPATIENT
Start: 2022-02-21 | End: 2022-03-07

## 2022-02-21 NOTE — PROGRESS NOTES
Verbal order per Dr. Kirstin Garcia for urine drug screen. Positive for BUP and THC. Verified results with [facility]. Dr. Kirstin Garcia ordered Suboxone 8 mg film BID and Suboxone 4 mg film daily for patient. Verified dose with patient. Patient was sent home with script for Suboxone 8 mg film BID and Suboxone 4 mg film daily for 14 days and will be seen back in the office 03/07/2022.
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
## 2022-03-08 ENCOUNTER — TELEPHONE (OUTPATIENT)
Dept: INTERNAL MEDICINE CLINIC | Age: 32
End: 2022-03-08

## 2022-03-09 ENCOUNTER — OFFICE VISIT (OUTPATIENT)
Dept: INTERNAL MEDICINE CLINIC | Age: 32
End: 2022-03-09

## 2022-03-09 VITALS
WEIGHT: 163 LBS | SYSTOLIC BLOOD PRESSURE: 96 MMHG | BODY MASS INDEX: 22.82 KG/M2 | HEART RATE: 88 BPM | TEMPERATURE: 97.8 F | DIASTOLIC BLOOD PRESSURE: 63 MMHG | HEIGHT: 71 IN

## 2022-03-09 DIAGNOSIS — F19.10 POLYSUBSTANCE ABUSE (HCC): ICD-10-CM

## 2022-03-09 DIAGNOSIS — B18.2 HEP C W/O COMA, CHRONIC (HCC): ICD-10-CM

## 2022-03-09 DIAGNOSIS — Z51.81 ENCOUNTER FOR MONITORING SUBOXONE MAINTENANCE THERAPY: ICD-10-CM

## 2022-03-09 DIAGNOSIS — F32.A DEPRESSION, UNSPECIFIED DEPRESSION TYPE: ICD-10-CM

## 2022-03-09 DIAGNOSIS — Z79.899 ENCOUNTER FOR MONITORING SUBOXONE MAINTENANCE THERAPY: ICD-10-CM

## 2022-03-09 DIAGNOSIS — F11.20 SEVERE OPIOID USE DISORDER (HCC): Primary | ICD-10-CM

## 2022-03-09 PROCEDURE — 99214 OFFICE O/P EST MOD 30 MIN: CPT | Performed by: INTERNAL MEDICINE

## 2022-03-09 PROCEDURE — 80305 DRUG TEST PRSMV DIR OPT OBS: CPT | Performed by: INTERNAL MEDICINE

## 2022-03-09 RX ORDER — SERTRALINE HYDROCHLORIDE 25 MG/1
25 TABLET, FILM COATED ORAL DAILY
Qty: 7 TABLET | Refills: 0 | Status: SHIPPED | OUTPATIENT
Start: 2022-03-09 | End: 2022-08-24 | Stop reason: ALTCHOICE

## 2022-03-09 RX ORDER — BUPRENORPHINE AND NALOXONE 4; 1 MG/1; MG/1
1 FILM, SOLUBLE BUCCAL; SUBLINGUAL DAILY
Qty: 14 FILM | Refills: 0 | Status: SHIPPED | OUTPATIENT
Start: 2022-03-09 | End: 2022-03-23 | Stop reason: SDUPTHER

## 2022-03-09 RX ORDER — BUPRENORPHINE AND NALOXONE 8; 2 MG/1; MG/1
1 FILM, SOLUBLE BUCCAL; SUBLINGUAL 2 TIMES DAILY
Qty: 28 FILM | Refills: 0 | Status: SHIPPED | OUTPATIENT
Start: 2022-03-09 | End: 2022-03-23 | Stop reason: SDUPTHER
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
## 2022-03-09 NOTE — PROGRESS NOTES
Verbal order per Dr. Keli Lowry for urine drug screen. Positive for BUP THC. Verified results with Vitaly Narvaez RN. Dr. Keli Lowry ordered Suboxone 8mg film BID and Suboxone 4mg film daily  for patient. Verified dose with patient. Patient was sent home with 2 week script of Suboxone 8mg film BID and Suboxone 4mg film daily and will be seen back in the office 3/23/22.

## 2022-03-23 ENCOUNTER — OFFICE VISIT (OUTPATIENT)
Dept: INTERNAL MEDICINE CLINIC | Age: 32
End: 2022-03-23

## 2022-03-23 VITALS
DIASTOLIC BLOOD PRESSURE: 85 MMHG | SYSTOLIC BLOOD PRESSURE: 124 MMHG | HEIGHT: 71 IN | BODY MASS INDEX: 22.68 KG/M2 | TEMPERATURE: 97.5 F | WEIGHT: 162 LBS | HEART RATE: 114 BPM

## 2022-03-23 DIAGNOSIS — Z51.81 ENCOUNTER FOR MONITORING SUBOXONE MAINTENANCE THERAPY: ICD-10-CM

## 2022-03-23 DIAGNOSIS — F11.20 SEVERE OPIOID USE DISORDER (HCC): Primary | ICD-10-CM

## 2022-03-23 DIAGNOSIS — F19.10 POLYSUBSTANCE ABUSE (HCC): ICD-10-CM

## 2022-03-23 DIAGNOSIS — Z79.899 ENCOUNTER FOR MONITORING SUBOXONE MAINTENANCE THERAPY: ICD-10-CM

## 2022-03-23 DIAGNOSIS — B18.2 HEP C W/O COMA, CHRONIC (HCC): ICD-10-CM

## 2022-03-23 PROCEDURE — 99214 OFFICE O/P EST MOD 30 MIN: CPT | Performed by: INTERNAL MEDICINE

## 2022-03-23 PROCEDURE — 80305 DRUG TEST PRSMV DIR OPT OBS: CPT | Performed by: INTERNAL MEDICINE

## 2022-03-23 RX ORDER — BUPRENORPHINE AND NALOXONE 8; 2 MG/1; MG/1
1 FILM, SOLUBLE BUCCAL; SUBLINGUAL 2 TIMES DAILY
Qty: 28 FILM | Refills: 0 | Status: SHIPPED | OUTPATIENT
Start: 2022-03-23 | End: 2022-04-06 | Stop reason: SDUPTHER

## 2022-03-23 RX ORDER — BUPRENORPHINE AND NALOXONE 4; 1 MG/1; MG/1
1 FILM, SOLUBLE BUCCAL; SUBLINGUAL DAILY
Qty: 14 FILM | Refills: 0 | Status: SHIPPED | OUTPATIENT
Start: 2022-03-23 | End: 2022-04-06 | Stop reason: SDUPTHER
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
## 2022-04-06 ENCOUNTER — OFFICE VISIT (OUTPATIENT)
Dept: INTERNAL MEDICINE CLINIC | Age: 32
End: 2022-04-06

## 2022-04-06 VITALS
BODY MASS INDEX: 22.29 KG/M2 | TEMPERATURE: 98.1 F | RESPIRATION RATE: 20 BRPM | DIASTOLIC BLOOD PRESSURE: 70 MMHG | HEIGHT: 71 IN | HEART RATE: 88 BPM | WEIGHT: 159.2 LBS | SYSTOLIC BLOOD PRESSURE: 111 MMHG

## 2022-04-06 DIAGNOSIS — Z51.81 ENCOUNTER FOR MONITORING SUBOXONE MAINTENANCE THERAPY: ICD-10-CM

## 2022-04-06 DIAGNOSIS — Z79.899 ENCOUNTER FOR MONITORING SUBOXONE MAINTENANCE THERAPY: ICD-10-CM

## 2022-04-06 DIAGNOSIS — B18.2 HEP C W/O COMA, CHRONIC (HCC): ICD-10-CM

## 2022-04-06 DIAGNOSIS — F19.10 POLYSUBSTANCE ABUSE (HCC): ICD-10-CM

## 2022-04-06 DIAGNOSIS — F11.20 SEVERE OPIOID USE DISORDER (HCC): Primary | ICD-10-CM

## 2022-04-06 DIAGNOSIS — F32.A DEPRESSION, UNSPECIFIED DEPRESSION TYPE: ICD-10-CM

## 2022-04-06 PROCEDURE — 80305 DRUG TEST PRSMV DIR OPT OBS: CPT | Performed by: INTERNAL MEDICINE

## 2022-04-06 PROCEDURE — 99214 OFFICE O/P EST MOD 30 MIN: CPT | Performed by: INTERNAL MEDICINE

## 2022-04-06 RX ORDER — BUPRENORPHINE AND NALOXONE 4; 1 MG/1; MG/1
1 FILM, SOLUBLE BUCCAL; SUBLINGUAL DAILY
Qty: 21 FILM | Refills: 0 | Status: SHIPPED | OUTPATIENT
Start: 2022-04-06 | End: 2022-05-03 | Stop reason: SDUPTHER

## 2022-04-06 RX ORDER — BUPRENORPHINE AND NALOXONE 8; 2 MG/1; MG/1
1 FILM, SOLUBLE BUCCAL; SUBLINGUAL 2 TIMES DAILY
Qty: 42 FILM | Refills: 0 | Status: SHIPPED | OUTPATIENT
Start: 2022-04-06 | End: 2022-05-03 | Stop reason: SDUPTHER
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
## 2022-04-06 NOTE — PROGRESS NOTES
Verbal order per Dr. Jonny Lindsay for urine drug screen. Positive for BUP and THC. [facility] ordered Suboxone 8 mg films BID and Suboxone 4 mg daily for patient. Verified dose with patient. Patient was sent home with a script for Suboxone 8 mg film BID and Suboxone 4 mg film daily for 21 days and will be seen back in the office 04/27/2022.

## 2022-05-03 ENCOUNTER — OFFICE VISIT (OUTPATIENT)
Dept: INTERNAL MEDICINE CLINIC | Age: 32
End: 2022-05-03

## 2022-05-03 VITALS
WEIGHT: 158 LBS | BODY MASS INDEX: 22.12 KG/M2 | SYSTOLIC BLOOD PRESSURE: 95 MMHG | HEART RATE: 70 BPM | HEIGHT: 71 IN | TEMPERATURE: 98.3 F | DIASTOLIC BLOOD PRESSURE: 54 MMHG

## 2022-05-03 DIAGNOSIS — Z79.899 ENCOUNTER FOR MONITORING SUBOXONE MAINTENANCE THERAPY: ICD-10-CM

## 2022-05-03 DIAGNOSIS — F32.A DEPRESSION, UNSPECIFIED DEPRESSION TYPE: ICD-10-CM

## 2022-05-03 DIAGNOSIS — F11.20 SEVERE OPIOID USE DISORDER (HCC): Primary | ICD-10-CM

## 2022-05-03 DIAGNOSIS — F19.10 POLYSUBSTANCE ABUSE (HCC): ICD-10-CM

## 2022-05-03 DIAGNOSIS — Z51.81 ENCOUNTER FOR MONITORING SUBOXONE MAINTENANCE THERAPY: ICD-10-CM

## 2022-05-03 DIAGNOSIS — B18.2 HEP C W/O COMA, CHRONIC (HCC): ICD-10-CM

## 2022-05-03 PROCEDURE — 99214 OFFICE O/P EST MOD 30 MIN: CPT | Performed by: INTERNAL MEDICINE

## 2022-05-03 PROCEDURE — 80305 DRUG TEST PRSMV DIR OPT OBS: CPT | Performed by: INTERNAL MEDICINE

## 2022-05-03 RX ORDER — BUPRENORPHINE AND NALOXONE 4; 1 MG/1; MG/1
1 FILM, SOLUBLE BUCCAL; SUBLINGUAL DAILY
Qty: 21 FILM | Refills: 0 | Status: SHIPPED | OUTPATIENT
Start: 2022-05-03 | End: 2022-05-24 | Stop reason: SDUPTHER

## 2022-05-03 RX ORDER — BUPRENORPHINE AND NALOXONE 8; 2 MG/1; MG/1
1 FILM, SOLUBLE BUCCAL; SUBLINGUAL 2 TIMES DAILY
Qty: 42 FILM | Refills: 0 | Status: SHIPPED | OUTPATIENT
Start: 2022-05-03 | End: 2022-05-24 | Stop reason: SDUPTHER
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
## 2022-05-03 NOTE — PROGRESS NOTES
Verbal order per Dr. Kaela Soto for urine drug screen. Positive for BUP and THC. Verified results with Damaso Silvestre LAURA. Dr. Kaela Soto ordered Suboxone 8 mg film BID and Suboxone 4 mg film daily for patient. Verified dose with patient. Patient was sent home with a script for Suboxone 8 mg film BID and Suboxone 4 mg film daily for 21 days and will be seen back in the office 05/24/2022.

## 2022-05-24 ENCOUNTER — OFFICE VISIT (OUTPATIENT)
Dept: INTERNAL MEDICINE CLINIC | Age: 32
End: 2022-05-24

## 2022-05-24 VITALS
DIASTOLIC BLOOD PRESSURE: 57 MMHG | HEIGHT: 71 IN | TEMPERATURE: 97.8 F | WEIGHT: 158 LBS | BODY MASS INDEX: 22.12 KG/M2 | SYSTOLIC BLOOD PRESSURE: 105 MMHG | HEART RATE: 63 BPM

## 2022-05-24 DIAGNOSIS — Z79.899 ENCOUNTER FOR MONITORING SUBOXONE MAINTENANCE THERAPY: ICD-10-CM

## 2022-05-24 DIAGNOSIS — B18.2 HEP C W/O COMA, CHRONIC (HCC): ICD-10-CM

## 2022-05-24 DIAGNOSIS — Z51.81 ENCOUNTER FOR MONITORING SUBOXONE MAINTENANCE THERAPY: ICD-10-CM

## 2022-05-24 DIAGNOSIS — F11.20 SEVERE OPIOID USE DISORDER (HCC): Primary | ICD-10-CM

## 2022-05-24 DIAGNOSIS — F19.10 POLYSUBSTANCE ABUSE (HCC): ICD-10-CM

## 2022-05-24 PROCEDURE — 99214 OFFICE O/P EST MOD 30 MIN: CPT | Performed by: INTERNAL MEDICINE

## 2022-05-24 PROCEDURE — 80305 DRUG TEST PRSMV DIR OPT OBS: CPT | Performed by: INTERNAL MEDICINE

## 2022-05-24 RX ORDER — BUPRENORPHINE AND NALOXONE 4; 1 MG/1; MG/1
1 FILM, SOLUBLE BUCCAL; SUBLINGUAL DAILY
Qty: 21 FILM | Refills: 0 | Status: SHIPPED | OUTPATIENT
Start: 2022-05-24 | End: 2022-06-14 | Stop reason: SDUPTHER

## 2022-05-24 RX ORDER — BUPRENORPHINE AND NALOXONE 8; 2 MG/1; MG/1
1 FILM, SOLUBLE BUCCAL; SUBLINGUAL 2 TIMES DAILY
Qty: 42 FILM | Refills: 0 | Status: SHIPPED | OUTPATIENT
Start: 2022-05-24 | End: 2022-06-14 | Stop reason: SDUPTHER
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
## 2022-05-24 NOTE — PROGRESS NOTES
Verbal order per Dr. Shaq Moise for urine drug screen. Positive for BUP and THC. Verified results with Jacob ZIMMERMAN LPN. Dr. Shaq Moise ordered Suboxone 8 mg film BID and Suboxone 4 mg film daily for patient. Verified dose with patient. Patient was sent home with a script for Suboxone 8 mg film BID and Suboxone 4 mg film daily for 21 days and will be seen back in the office 06/14/2022. None

## 2022-06-14 ENCOUNTER — OFFICE VISIT (OUTPATIENT)
Dept: INTERNAL MEDICINE CLINIC | Age: 32
End: 2022-06-14

## 2022-06-14 VITALS
TEMPERATURE: 97.3 F | BODY MASS INDEX: 19.32 KG/M2 | HEART RATE: 61 BPM | HEIGHT: 71 IN | DIASTOLIC BLOOD PRESSURE: 67 MMHG | SYSTOLIC BLOOD PRESSURE: 94 MMHG | WEIGHT: 138 LBS

## 2022-06-14 DIAGNOSIS — Z51.81 ENCOUNTER FOR MONITORING SUBOXONE MAINTENANCE THERAPY: ICD-10-CM

## 2022-06-14 DIAGNOSIS — F32.A DEPRESSION, UNSPECIFIED DEPRESSION TYPE: ICD-10-CM

## 2022-06-14 DIAGNOSIS — B18.2 HEP C W/O COMA, CHRONIC (HCC): ICD-10-CM

## 2022-06-14 DIAGNOSIS — F19.10 POLYSUBSTANCE ABUSE (HCC): ICD-10-CM

## 2022-06-14 DIAGNOSIS — F11.20 SEVERE OPIOID USE DISORDER (HCC): Primary | ICD-10-CM

## 2022-06-14 DIAGNOSIS — Z79.899 ENCOUNTER FOR MONITORING SUBOXONE MAINTENANCE THERAPY: ICD-10-CM

## 2022-06-14 PROCEDURE — 80305 DRUG TEST PRSMV DIR OPT OBS: CPT | Performed by: INTERNAL MEDICINE

## 2022-06-14 PROCEDURE — 99214 OFFICE O/P EST MOD 30 MIN: CPT | Performed by: INTERNAL MEDICINE

## 2022-06-14 RX ORDER — BUPRENORPHINE AND NALOXONE 4; 1 MG/1; MG/1
1 FILM, SOLUBLE BUCCAL; SUBLINGUAL DAILY
Qty: 21 FILM | Refills: 0 | Status: SHIPPED | OUTPATIENT
Start: 2022-06-14 | End: 2022-07-05

## 2022-06-14 RX ORDER — BUPRENORPHINE AND NALOXONE 8; 2 MG/1; MG/1
1 FILM, SOLUBLE BUCCAL; SUBLINGUAL 2 TIMES DAILY
Qty: 42 FILM | Refills: 0 | Status: SHIPPED | OUTPATIENT
Start: 2022-06-14 | End: 2022-07-08 | Stop reason: SDUPTHER
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
## 2022-06-14 NOTE — PROGRESS NOTES
Verbal order per Dr. Ralph Vega for urine drug screen. Positive for BUP and THC. [facility] Ralph Vega ordered Suboxone 8 mg film BID and Suboxone 4 mg film daily  for patient. Verified dose with patient. Patient was sent home with a script for Suboxone 8 mg film BID for 21 days and Suboxone 4 mg flm daily for 21 days and will be seen back in the office 07/05/2022.

## 2022-07-08 ENCOUNTER — OFFICE VISIT (OUTPATIENT)
Dept: INTERNAL MEDICINE CLINIC | Age: 32
End: 2022-07-08

## 2022-07-08 VITALS
WEIGHT: 141.2 LBS | SYSTOLIC BLOOD PRESSURE: 142 MMHG | TEMPERATURE: 98 F | HEART RATE: 68 BPM | DIASTOLIC BLOOD PRESSURE: 80 MMHG | HEIGHT: 71 IN | RESPIRATION RATE: 18 BRPM | BODY MASS INDEX: 19.77 KG/M2

## 2022-07-08 DIAGNOSIS — Z51.81 ENCOUNTER FOR MONITORING SUBOXONE MAINTENANCE THERAPY: ICD-10-CM

## 2022-07-08 DIAGNOSIS — Z79.899 ENCOUNTER FOR MONITORING SUBOXONE MAINTENANCE THERAPY: ICD-10-CM

## 2022-07-08 DIAGNOSIS — F11.20 SEVERE OPIOID USE DISORDER (HCC): Primary | ICD-10-CM

## 2022-07-08 DIAGNOSIS — F19.10 POLYSUBSTANCE ABUSE (HCC): ICD-10-CM

## 2022-07-08 DIAGNOSIS — B18.2 HEP C W/O COMA, CHRONIC (HCC): ICD-10-CM

## 2022-07-08 PROCEDURE — 80305 DRUG TEST PRSMV DIR OPT OBS: CPT | Performed by: INTERNAL MEDICINE

## 2022-07-08 PROCEDURE — 99214 OFFICE O/P EST MOD 30 MIN: CPT | Performed by: INTERNAL MEDICINE

## 2022-07-08 RX ORDER — BUPRENORPHINE AND NALOXONE 4; 1 MG/1; MG/1
1 FILM, SOLUBLE BUCCAL; SUBLINGUAL DAILY
Qty: 21 FILM | Refills: 0 | Status: SHIPPED | OUTPATIENT
Start: 2022-07-08 | End: 2022-08-08 | Stop reason: SDUPTHER

## 2022-07-08 RX ORDER — BUPRENORPHINE AND NALOXONE 8; 2 MG/1; MG/1
1 FILM, SOLUBLE BUCCAL; SUBLINGUAL 2 TIMES DAILY
Qty: 42 FILM | Refills: 0 | Status: SHIPPED | OUTPATIENT
Start: 2022-07-08 | End: 2022-08-08 | Stop reason: SDUPTHER

## 2022-07-08 NOTE — PROGRESS NOTES
under the tongue 2 times daily for 21 days. 42 Film 0    buprenorphine-naloxone (SUBOXONE) 4-1 MG FILM SL film Place 1 Film under the tongue daily for 21 days. 21 Film 0    sertraline (ZOLOFT) 25 MG tablet Take 1 tablet by mouth daily for 7 days 7 tablet 0    sertraline (ZOLOFT) 50 MG tablet Take 1 tablet by mouth daily 30 tablet 5     No current facility-administered medications for this visit. 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 rate 18, height 5' 11\" (1.803 m), weight 141 lb 3.2 oz (64 kg).     Mental status examination    Cognition: oriented to person place and time      Appearance: Patient is in no acute distress, normal appearance, patient does not appear intoxicated/    Memory: Normal    Behavior/motor: Normal    Mood: Depressed    Affect: Mood congruent    Attitude toward examiner: Pleasant, respectful    Thought content no delusions hallucinations suicidal ideation    Insight: improving    Judgment: improving    Eyes: Pupils normal    Skin: No track marks noted ,no rashes noted                          URINE DRUG SCREEN TODAY:  lcohol, Urine NEG    Amphetamine Screen, Urine NEG    Barbiturate Screen, Urine NEG    Benzodiazepine Screen, Urine NEG    Buprenorphine Urine POS    Cocaine Metabolite Screen, Urine NEG    FENTANYL SCREEN, URINE NEG    Gabapentin Screen, Urine N/A    MDMA, Urine NEG    Methadone Screen, Urine NEG    Methamphetamine, Urine NEG    Opiate Scrn, Ur NEG    Oxycodone Screen, Ur NEG    PCP Screen, Urine NEG    Propoxyphene Screen, Urine N/A    Synthetic Cannabinoids (K2) Screen, Urine NEG    THC Screen, Urine POS    Tramadol Scrn, Ur NEG    Tricyclic Antidepressants, Urine N/A         Diagnosis Orders   1. Severe opioid use disorder (HCC)  POCT Rapid Drug Screen    buprenorphine-naloxone (SUBOXONE) 8-2 MG FILM SL film    buprenorphine-naloxone (SUBOXONE) 4-1 MG FILM SL film   2. Polysubstance abuse ([facility] [address]     3. Hep C w/o coma, chronic (HCC)     4. Encounter for monitoring Suboxone maintenance therapy           PLAN:  Urine has no meth,   He had been having some urges on 16 mg Suboxone daily  I  increased him to 20 mg Suboxone daily for severe opioid use disorder   increased Zoloft to 100 mg daily for depression  He was given 8 mg Suboxone while here in the office    Patient has hep C he is waiting to get insurance coverage before he starts treatment  This should be within 90 days  I told him I can treat this  Once he gets his insurance he will let us know  Epclusa    I reviewed the [place] Automated Rx Reporting System report     There does not appear to be any discrepancies or overprescribing of controlled substances  Patient signed up for the recovery danelle, Reset  I reviewed his usage he has done 10 lessons each in a very short time.   I question the thoroughness of his review and told him such  Follow-up 3 weeks

## 2022-07-08 NOTE — PROGRESS NOTES
Verbal order per Dr. Daisy Vera for urine drug screen. Positive for BUP and THC. Verified results with Alivia. Dr. Daisy Vera ordered Suboxone 8 mg film BID and Suboxone 4 mg film daily for patient. Verified dose with patient. Patient was sent home with a script for Suboxone 8 mg film BID and Suboxone 4 mg film daily for 21 days and will be seen back in the office 07/29/2022. Suboxone 8 mg film one given out of stock to take home.

## 2022-08-08 ENCOUNTER — OFFICE VISIT (OUTPATIENT)
Dept: INTERNAL MEDICINE CLINIC | Age: 32
End: 2022-08-08

## 2022-08-08 VITALS
HEART RATE: 89 BPM | WEIGHT: 134 LBS | DIASTOLIC BLOOD PRESSURE: 70 MMHG | BODY MASS INDEX: 18.76 KG/M2 | HEIGHT: 71 IN | TEMPERATURE: 97.8 F | SYSTOLIC BLOOD PRESSURE: 136 MMHG

## 2022-08-08 DIAGNOSIS — Z51.81 ENCOUNTER FOR MONITORING SUBOXONE MAINTENANCE THERAPY: ICD-10-CM

## 2022-08-08 DIAGNOSIS — F11.20 SEVERE OPIOID USE DISORDER (HCC): Primary | ICD-10-CM

## 2022-08-08 DIAGNOSIS — B18.2 HEP C W/O COMA, CHRONIC (HCC): ICD-10-CM

## 2022-08-08 DIAGNOSIS — Z79.899 ENCOUNTER FOR MONITORING SUBOXONE MAINTENANCE THERAPY: ICD-10-CM

## 2022-08-08 DIAGNOSIS — F19.10 POLYSUBSTANCE ABUSE (HCC): ICD-10-CM

## 2022-08-08 PROCEDURE — 99214 OFFICE O/P EST MOD 30 MIN: CPT | Performed by: INTERNAL MEDICINE

## 2022-08-08 PROCEDURE — 80305 DRUG TEST PRSMV DIR OPT OBS: CPT | Performed by: INTERNAL MEDICINE

## 2022-08-08 RX ORDER — BUPRENORPHINE AND NALOXONE 4; 1 MG/1; MG/1
1 FILM, SOLUBLE BUCCAL; SUBLINGUAL DAILY
Qty: 14 FILM | Refills: 0 | Status: SHIPPED | OUTPATIENT
Start: 2022-08-08 | End: 2022-08-24 | Stop reason: SDUPTHER

## 2022-08-08 RX ORDER — BUPRENORPHINE AND NALOXONE 8; 2 MG/1; MG/1
1 FILM, SOLUBLE BUCCAL; SUBLINGUAL 2 TIMES DAILY
Qty: 28 FILM | Refills: 0 | Status: SHIPPED | OUTPATIENT
Start: 2022-08-08 | End: 2022-08-24 | Stop reason: SDUPTHER

## 2022-08-08 NOTE — PROGRESS NOTES
Verbal order per Dr. Petrona Guerin for urine drug screen. Positive for BUP and THC. Verified results with Jhon Davalos LPN. Dr. Petrona Guerin ordered Suboxone 8 mg film BID and Suboxone 2 mg film daily for patient. Verified dose with patient. Patient was sent home with Suboxone 8 mg film BID and Subooxne 2 mg film daily for 14 days and will be seen back in the office 08/22/2022. I reviewed with the resident the medical history and the resident's findings on the physical examination. I discussed with the resident the patient's diagnosis and concur with the plan.   Patient has insurance now we will order the required labs for hep C treatment
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
## 2022-08-14 DIAGNOSIS — F32.A DEPRESSION, UNSPECIFIED DEPRESSION TYPE: ICD-10-CM

## 2022-08-16 DIAGNOSIS — F32.A DEPRESSION, UNSPECIFIED DEPRESSION TYPE: ICD-10-CM

## 2022-08-18 DIAGNOSIS — F32.A DEPRESSION, UNSPECIFIED DEPRESSION TYPE: ICD-10-CM

## 2022-08-24 ENCOUNTER — OFFICE VISIT (OUTPATIENT)
Dept: INTERNAL MEDICINE CLINIC | Age: 32
End: 2022-08-24

## 2022-08-24 VITALS
DIASTOLIC BLOOD PRESSURE: 77 MMHG | HEIGHT: 71 IN | WEIGHT: 137 LBS | TEMPERATURE: 99.1 F | HEART RATE: 103 BPM | SYSTOLIC BLOOD PRESSURE: 117 MMHG | BODY MASS INDEX: 19.18 KG/M2

## 2022-08-24 DIAGNOSIS — F32.A DEPRESSION, UNSPECIFIED DEPRESSION TYPE: Primary | ICD-10-CM

## 2022-08-24 DIAGNOSIS — F11.20 SEVERE OPIOID USE DISORDER (HCC): Primary | ICD-10-CM

## 2022-08-24 PROCEDURE — 99214 OFFICE O/P EST MOD 30 MIN: CPT | Performed by: NURSE PRACTITIONER

## 2022-08-24 PROCEDURE — 80305 DRUG TEST PRSMV DIR OPT OBS: CPT | Performed by: NURSE PRACTITIONER

## 2022-08-24 RX ORDER — SERTRALINE HYDROCHLORIDE 100 MG/1
100 TABLET, FILM COATED ORAL DAILY
Qty: 90 TABLET | Refills: 1 | Status: SHIPPED | OUTPATIENT
Start: 2022-08-24 | End: 2022-10-07 | Stop reason: ALTCHOICE

## 2022-08-24 RX ORDER — BUPRENORPHINE AND NALOXONE 8; 2 MG/1; MG/1
1 FILM, SOLUBLE BUCCAL; SUBLINGUAL 2 TIMES DAILY
Qty: 28 FILM | Refills: 0 | Status: SHIPPED | OUTPATIENT
Start: 2022-08-24 | End: 2022-09-07

## 2022-08-24 RX ORDER — QUETIAPINE FUMARATE 50 MG/1
50 TABLET, FILM COATED ORAL DAILY
Qty: 60 TABLET | Refills: 0 | Status: SHIPPED | OUTPATIENT
Start: 2022-08-24 | End: 2022-09-09 | Stop reason: SDUPTHER

## 2022-08-24 RX ORDER — BUPRENORPHINE AND NALOXONE 4; 1 MG/1; MG/1
1 FILM, SOLUBLE BUCCAL; SUBLINGUAL DAILY
Qty: 14 FILM | Refills: 0 | Status: SHIPPED | OUTPATIENT
Start: 2022-08-24 | End: 2022-09-07

## 2022-08-24 ASSESSMENT — ENCOUNTER SYMPTOMS
RESPIRATORY NEGATIVE: 1
GASTROINTESTINAL NEGATIVE: 1

## 2022-08-24 NOTE — PROGRESS NOTES
08/24/22   The patients primary care physician is None Provider    Ganesh Lynch is a 28 y.o.  male who presents in office today for follow up medication assisted treatment. He was last seen on 7/8. First office visit 11/30/21  Pt denies any urges, triggers, or cravings. Pt reports increased stress and worsening depression. States his pharmacy would not refill his zoloft. He borrowed zoloft from a friend but not taking regular. He feels symptoms were controlled when taking medication. I did call the pharmacy and a prescription is available for  today  Pt is aware he needs counseling and treatment for his mental health. Transportation is a concern for him. He does have a strong family support. Pt agrees to speak with Martha Sanchez today for referral and recommendations. He is active with the reset-o application    Pt struggles with insomnia. Sleeps on average only 3-4 hours. He has been on several medications in the past. Will start seroquel to assist with mood, sleep, and anxiety. Likely increase at next visit. Pt educated on medication    UDS + buprenorphine and THC      No past medical history on file.       Social History     Socioeconomic History    Marital status: Single     Spouse name: Not on file    Number of children: Not on file    Years of education: Not on file    Highest education level: Not on file   Occupational History    Not on file   Tobacco Use    Smoking status: Every Day     Packs/day: 0.25     Types: Cigarettes    Smokeless tobacco: Never   Substance and Sexual Activity    Alcohol use: Not Currently    Drug use: Yes     Types: Oxycodone (Oxy)     Comment: last used oxy and meth 11/28/21    Sexual activity: Not on file   Other Topics Concern    Not on file   Social History Narrative    Not on file     Social Determinants of Health     Financial Resource Strain: Not on file   Food Insecurity: Not on file   Transportation Needs: Not on file   Physical Activity: Not on file Stress: Not on file   Social Connections: Not on file   Intimate Partner Violence: Not on file   Housing Stability: Not on file         Current Outpatient Medications on File Prior to Visit   Medication Sig Dispense Refill    sertraline (ZOLOFT) 50 MG tablet Take 1 tablet by mouth twice daily 60 tablet 0    sertraline (ZOLOFT) 25 MG tablet Take 1 tablet by mouth daily for 7 days 7 tablet 0     No current facility-administered medications on file prior to visit. Review of Systems   Constitutional: Negative. Respiratory: Negative. Cardiovascular: Negative. Gastrointestinal: Negative. Skin: Negative. Psychiatric/Behavioral:  Positive for dysphoric mood and sleep disturbance. Negative for suicidal ideas. The patient is nervous/anxious. Physical Exam    Cognition: alert, oriented to person, place, and time  Appearance: appropriate, no acute distress, does not appear intoxicated or in withdrawal  Memory: Normal  Behavioral/motor: normal  Affect: congruent  Attitude toward examiner: respectful, pleasant  Thought content: no delusions, hallucination, Denies suicidal ideation or intent  Insight: fair  Judgement: fair  Eyes: pupils normal  Skin: no rashes, no track marks noted        There is no problem list on file for this patient.       PDMP Monitoring:    Last PDMP Park Kimberley as Reviewed [facility]):  Review User Review Instant Review Result            Urine Drug Screenings (1 yr)          Medication Contract and Consent for Opioid Use Documents Filed       Patient Documents       Type of Document Status Date Received Received By Description    Medication Contract Received 11/30/2021  9:23 AM Roscoe Cole Huupy MED CONTRACT- 11/30/21                     I reviewed the [place] Automated Rx Reporting System report     There does not appear to be any discrepancies or overprescribing of controlled substances    GOAL:  To enhance patient recovery through the use of medication assisted treatment to improve overall quality of life. OBJECTIVE:  Patient will abstain from the use of mood altering substances 7 out of 7 days per week. INTERVENTION:  Patient will be maintained on suboxone medication.   The importance of combining medical assisted treatment with comprehensive treatment including counseling, support groups, and psychiatry as applicable was discussed with patient    Plan:   Orders Placed This Encounter   Procedures    POCT Rapid Drug Screen        CAREN Lakhani CNP 08/24/22 12:11 PM

## 2022-08-24 NOTE — PROGRESS NOTES
Verbal order per Thang Bey CNP for urine drug screen. Positive for BUP THC. Verified results with Ayanna Perea CNP ordered Suboxone 8mg film BID and Suboxone 4mg film daily  for patient. Verified dose with patient. Patient was sent home with 2 week script of Suboxone 8mg film BID and Suboxone 4mg film daily and will be seen back in the office 9/7/22.

## 2022-09-08 ENCOUNTER — OFFICE VISIT (OUTPATIENT)
Dept: INTERNAL MEDICINE CLINIC | Age: 32
End: 2022-09-08

## 2022-09-08 VITALS
BODY MASS INDEX: 19.46 KG/M2 | DIASTOLIC BLOOD PRESSURE: 66 MMHG | SYSTOLIC BLOOD PRESSURE: 108 MMHG | WEIGHT: 139 LBS | HEART RATE: 75 BPM | HEIGHT: 71 IN

## 2022-09-08 DIAGNOSIS — F11.20 SEVERE OPIOID USE DISORDER (HCC): ICD-10-CM

## 2022-09-08 DIAGNOSIS — F32.A ANXIETY AND DEPRESSION: Primary | ICD-10-CM

## 2022-09-08 DIAGNOSIS — F41.9 ANXIETY AND DEPRESSION: Primary | ICD-10-CM

## 2022-09-08 PROCEDURE — 80305 DRUG TEST PRSMV DIR OPT OBS: CPT | Performed by: NURSE PRACTITIONER

## 2022-09-08 PROCEDURE — 99214 OFFICE O/P EST MOD 30 MIN: CPT | Performed by: NURSE PRACTITIONER

## 2022-09-08 RX ORDER — BUPRENORPHINE AND NALOXONE 4; 1 MG/1; MG/1
1 FILM, SOLUBLE BUCCAL; SUBLINGUAL DAILY
Qty: 28 FILM | Refills: 0 | Status: CANCELLED | OUTPATIENT
Start: 2022-09-08 | End: 2022-10-06

## 2022-09-08 RX ORDER — BUPRENORPHINE AND NALOXONE 8; 2 MG/1; MG/1
1 FILM, SOLUBLE BUCCAL; SUBLINGUAL 2 TIMES DAILY
Qty: 56 FILM | Refills: 0 | Status: CANCELLED | OUTPATIENT
Start: 2022-09-08 | End: 2022-10-06

## 2022-09-08 RX ORDER — QUETIAPINE FUMARATE 100 MG/1
100 TABLET, FILM COATED ORAL DAILY
Qty: 60 TABLET | Refills: 0 | Status: CANCELLED | OUTPATIENT
Start: 2022-09-08 | End: 2022-11-07

## 2022-09-08 NOTE — PROGRESS NOTES
(Oxy).    Health Maintenance:    Health Maintenance   Topic Date Due    COVID-19 Vaccine (1) Never done    Hepatitis A vaccine (1 of 2 - Risk 2-dose series) Never done    Varicella vaccine (1 of 2 - 2-dose childhood series) Never done    Pneumococcal 0-64 years Vaccine (1 - PCV) Never done    Depression Monitoring  Never done    HIV screen  Never done    Hepatitis C screen  Never done    Hepatitis B vaccine (1 of 3 - Risk 3-dose series) Never done    DTaP/Tdap/Td vaccine (1 - Tdap) Never done    Flu vaccine (1) Never done    Hib vaccine  Aged Out    Meningococcal (ACWY) vaccine  Aged Out       Subjective:      Review of Systems   All other systems reviewed and are negative. Objective:     /66 (Site: Right Lower Arm, Position: Sitting, Cuff Size: Medium Adult)   Pulse 75   Ht 5' 11\" (1.803 m)   Wt 139 lb (63 kg)   BMI 19.39 kg/m²     Physical Exam  Vitals reviewed. Constitutional:       General: He is not in acute distress. Appearance: Normal appearance. He is not ill-appearing. HENT:      Head: Normocephalic and atraumatic. Right Ear: External ear normal.      Left Ear: External ear normal.      Nose: Nose normal. No congestion or rhinorrhea. Mouth/Throat:      Mouth: Mucous membranes are moist.   Eyes:      Extraocular Movements: Extraocular movements intact. Conjunctiva/sclera: Conjunctivae normal.      Pupils: Pupils are equal, round, and reactive to light. Pulmonary:      Effort: Pulmonary effort is normal. No respiratory distress. Musculoskeletal:         General: Normal range of motion. Cervical back: Normal range of motion and neck supple. Right lower leg: No edema. Left lower leg: No edema. Skin:     General: Skin is warm and dry. Neurological:      General: No focal deficit present. Mental Status: He is alert and oriented to person, place, and time.    Psychiatric:         Mood and Affect: Mood normal.         Behavior: Behavior normal. Thought Content: Thought content normal.         Judgment: Judgment normal.       Labs Reviewed 9/8/2022:    No results found for: WBC, HGB, HCT, PLT, CHOL, TRIG, HDL, LDLDIRECT, ALT, AST, NA, K, CL, CREATININE, BUN, CO2, TSH, PSA, INR, GLUF, LABA1C, LABMICR    Assessment/Plan      1. Anxiety and depression    - Increase Seroquel to 100 mg nightly    2. Severe opioid use disorder (HCC)    - POCT Rapid Drug Screen  - Continue Suboxone 8 mg BID  - OARRS reviewed, no discrepancies  - Narcan at home  - Encouraged to attend NA/AA meetings and/or arrange for individualized counseling      Return in about 2 weeks (around 9/22/2022). Patient given educational materials - see patient instructions. Discussed use, benefit, and side effects of prescribed medications. All patient questions answered. Pt voiced understanding. Reviewed health maintenance.        Electronically signed CAREN Romero - CNP on 9/8/22 at 3:53 PM EDT

## 2022-09-08 NOTE — PROGRESS NOTES
[facility] for urine drug screen. Positive for BUP and THC. Verified results with Oscar Seaman LPN. [facility] LATESHA ordered Sublocade 100 mg subq to LUQ with no adverse reactions for patient. Verified dose with patient. Patient was sent home with no medications and will be seen back in the office 10/6/2022.

## 2022-09-09 DIAGNOSIS — F11.20 SEVERE OPIOID USE DISORDER (HCC): ICD-10-CM

## 2022-09-09 DIAGNOSIS — F32.A DEPRESSION, UNSPECIFIED DEPRESSION TYPE: Primary | ICD-10-CM

## 2022-09-09 RX ORDER — BUPRENORPHINE AND NALOXONE 8; 2 MG/1; MG/1
1 FILM, SOLUBLE BUCCAL; SUBLINGUAL 2 TIMES DAILY
Qty: 28 FILM | Refills: 0 | Status: CANCELLED | OUTPATIENT
Start: 2022-09-09 | End: 2022-09-23

## 2022-09-09 RX ORDER — QUETIAPINE FUMARATE 100 MG/1
100 TABLET, FILM COATED ORAL DAILY
Qty: 30 TABLET | Refills: 1 | Status: SHIPPED | OUTPATIENT
Start: 2022-09-09 | End: 2022-10-07 | Stop reason: SDUPTHER

## 2022-09-09 RX ORDER — BUPRENORPHINE AND NALOXONE 4; 1 MG/1; MG/1
1 FILM, SOLUBLE BUCCAL; SUBLINGUAL DAILY
Qty: 14 FILM | Refills: 0 | Status: CANCELLED | OUTPATIENT
Start: 2022-09-09 | End: 2022-09-23

## 2022-09-10 DIAGNOSIS — F11.20 SEVERE OPIOID USE DISORDER (HCC): Primary | ICD-10-CM

## 2022-09-10 RX ORDER — BUPRENORPHINE HYDROCHLORIDE, NALOXONE HYDROCHLORIDE 12; 3 MG/1; MG/1
1 FILM, SOLUBLE BUCCAL; SUBLINGUAL 2 TIMES DAILY
Qty: 50 FILM | Refills: 0 | Status: SHIPPED | OUTPATIENT
Start: 2022-09-10 | End: 2022-09-12 | Stop reason: SDUPTHER

## 2022-09-12 DIAGNOSIS — F11.20 SEVERE OPIOID USE DISORDER (HCC): ICD-10-CM

## 2022-09-12 RX ORDER — BUPRENORPHINE HYDROCHLORIDE, NALOXONE HYDROCHLORIDE 12; 3 MG/1; MG/1
1 FILM, SOLUBLE BUCCAL; SUBLINGUAL 2 TIMES DAILY
Qty: 50 FILM | Refills: 0 | Status: SHIPPED | OUTPATIENT
Start: 2022-09-12 | End: 2022-10-07 | Stop reason: SDUPTHER

## 2022-09-15 DIAGNOSIS — F11.20 SEVERE OPIOID USE DISORDER (HCC): Primary | ICD-10-CM

## 2022-09-15 RX ORDER — BUPRENORPHINE AND NALOXONE 8; 2 MG/1; MG/1
1.5 FILM, SOLUBLE BUCCAL; SUBLINGUAL 2 TIMES DAILY
Qty: 75 FILM | Refills: 0 | Status: SHIPPED | OUTPATIENT
Start: 2022-09-15 | End: 2022-10-07 | Stop reason: SDUPTHER

## 2022-10-07 ENCOUNTER — OFFICE VISIT (OUTPATIENT)
Dept: INTERNAL MEDICINE CLINIC | Age: 32
End: 2022-10-07

## 2022-10-07 VITALS
HEART RATE: 74 BPM | HEIGHT: 71 IN | DIASTOLIC BLOOD PRESSURE: 72 MMHG | BODY MASS INDEX: 21.14 KG/M2 | WEIGHT: 151 LBS | RESPIRATION RATE: 16 BRPM | TEMPERATURE: 99.3 F | SYSTOLIC BLOOD PRESSURE: 115 MMHG

## 2022-10-07 DIAGNOSIS — F11.20 SEVERE OPIOID USE DISORDER (HCC): Primary | ICD-10-CM

## 2022-10-07 DIAGNOSIS — F11.20 SEVERE OPIOID USE DISORDER (HCC): ICD-10-CM

## 2022-10-07 DIAGNOSIS — F32.A DEPRESSION, UNSPECIFIED DEPRESSION TYPE: ICD-10-CM

## 2022-10-07 PROCEDURE — 99213 OFFICE O/P EST LOW 20 MIN: CPT | Performed by: NURSE PRACTITIONER

## 2022-10-07 PROCEDURE — 80305 DRUG TEST PRSMV DIR OPT OBS: CPT | Performed by: NURSE PRACTITIONER

## 2022-10-07 RX ORDER — SERTRALINE HYDROCHLORIDE 100 MG/1
100 TABLET, FILM COATED ORAL DAILY
Qty: 30 TABLET | Refills: 3 | Status: SHIPPED | OUTPATIENT
Start: 2022-10-07 | End: 2022-10-25 | Stop reason: SDUPTHER

## 2022-10-07 RX ORDER — BUPRENORPHINE HYDROCHLORIDE, NALOXONE HYDROCHLORIDE 12; 3 MG/1; MG/1
1 FILM, SOLUBLE BUCCAL; SUBLINGUAL DAILY
Qty: 28 FILM | Refills: 0 | Status: SHIPPED | OUTPATIENT
Start: 2022-10-07 | End: 2022-11-04

## 2022-10-07 RX ORDER — BUPRENORPHINE AND NALOXONE 8; 2 MG/1; MG/1
1.5 FILM, SOLUBLE BUCCAL; SUBLINGUAL 2 TIMES DAILY
Qty: 75 FILM | Refills: 0 | Status: CANCELLED | OUTPATIENT
Start: 2022-10-07 | End: 2022-11-01

## 2022-10-07 RX ORDER — BUPRENORPHINE AND NALOXONE 8; 2 MG/1; MG/1
1.5 FILM, SOLUBLE BUCCAL; SUBLINGUAL 2 TIMES DAILY
Qty: 75 FILM | Refills: 0 | Status: SHIPPED | OUTPATIENT
Start: 2022-10-07 | End: 2022-11-01

## 2022-10-07 RX ORDER — QUETIAPINE FUMARATE 100 MG/1
100 TABLET, FILM COATED ORAL DAILY
Qty: 30 TABLET | Refills: 3 | Status: SHIPPED | OUTPATIENT
Start: 2022-10-07 | End: 2022-10-25 | Stop reason: SDUPTHER

## 2022-10-07 RX ORDER — BUPRENORPHINE AND NALOXONE 8; 2 MG/1; MG/1
1 FILM, SOLUBLE BUCCAL; SUBLINGUAL DAILY
Qty: 28 FILM | Refills: 0 | Status: SHIPPED | OUTPATIENT
Start: 2022-10-07 | End: 2022-11-04

## 2022-10-07 ASSESSMENT — PATIENT HEALTH QUESTIONNAIRE - PHQ9
2. FEELING DOWN, DEPRESSED OR HOPELESS: 1
9. THOUGHTS THAT YOU WOULD BE BETTER OFF DEAD, OR OF HURTING YOURSELF: 0
SUM OF ALL RESPONSES TO PHQ QUESTIONS 1-9: 17
SUM OF ALL RESPONSES TO PHQ QUESTIONS 1-9: 17
5. POOR APPETITE OR OVEREATING: 3
3. TROUBLE FALLING OR STAYING ASLEEP: 1
SUM OF ALL RESPONSES TO PHQ QUESTIONS 1-9: 17
6. FEELING BAD ABOUT YOURSELF - OR THAT YOU ARE A FAILURE OR HAVE LET YOURSELF OR YOUR FAMILY DOWN: 2
10. IF YOU CHECKED OFF ANY PROBLEMS, HOW DIFFICULT HAVE THESE PROBLEMS MADE IT FOR YOU TO DO YOUR WORK, TAKE CARE OF THINGS AT HOME, OR GET ALONG WITH OTHER PEOPLE: 0
SUM OF ALL RESPONSES TO PHQ9 QUESTIONS 1 & 2: 2
7. TROUBLE CONCENTRATING ON THINGS, SUCH AS READING THE NEWSPAPER OR WATCHING TELEVISION: 3
SUM OF ALL RESPONSES TO PHQ QUESTIONS 1-9: 17
4. FEELING TIRED OR HAVING LITTLE ENERGY: 3
1. LITTLE INTEREST OR PLEASURE IN DOING THINGS: 1
8. MOVING OR SPEAKING SO SLOWLY THAT OTHER PEOPLE COULD HAVE NOTICED. OR THE OPPOSITE, BEING SO FIGETY OR RESTLESS THAT YOU HAVE BEEN MOVING AROUND A LOT MORE THAN USUAL: 3

## 2022-10-07 NOTE — PROGRESS NOTES
Verbal order per Zehra Valero CNP for urine drug screen. Positive for BUP and THC. Verified results with Jay Méndez RN.

## 2022-10-07 NOTE — PROGRESS NOTES
10/07/22   The patients primary care physician is None Provider    Mario Mendoza is a 28 y.o.  male who presents in office today for follow up medication assisted treatment, substance use disorder. Pt denies any urges, triggers, or cravings. UDS acceptable, buprenorphine and THC    Reports sleeping well at night. Feels seroquel works well    He is employed and states working extended hours. Feels he would benefit from increasing zoloft. Reports some increased anxiety        Social History     Socioeconomic History    Marital status: Single     Spouse name: Not on file    Number of children: Not on file    Years of education: Not on file    Highest education level: Not on file   Occupational History    Not on file   Tobacco Use    Smoking status: Every Day     Packs/day: 0.25     Types: Cigarettes    Smokeless tobacco: Never   Substance and Sexual Activity    Alcohol use: Not Currently    Drug use: Yes     Types: Oxycodone (Oxy)     Comment: last used oxy and meth 11/28/21    Sexual activity: Not on file   Other Topics Concern    Not on file   Social History Narrative    Not on file     Social Determinants of Health     Financial Resource Strain: Not on file   Food Insecurity: Not on file   Transportation Needs: Not on file   Physical Activity: Not on file   Stress: Not on file   Social Connections: Not on file   Intimate Partner Violence: Not on file   Housing Stability: Not on file         Current Outpatient Medications on File Prior to Visit   Medication Sig Dispense Refill    buprenorphine-naloxone (SUBOXONE) 8-2 MG FILM SL film Place 1.5 Film under the tongue in the morning and at bedtime for 25 days. 75 Film 0    SUBOXONE 12-3 MG sublingual film Place 1 Film under the tongue in the morning and 1 Film in the evening. Do all this for 25 days.  50 Film 0    QUEtiapine (SEROQUEL) 100 MG tablet Take 1 tablet by mouth daily 30 tablet 1    sertraline (ZOLOFT) 100 MG tablet Take 1 tablet by mouth daily 90 tablet 1     No current facility-administered medications on file prior to visit. Vitals:    10/07/22 0946   BP: 115/72   Pulse: 74   Resp: 16   Temp: 99.3 °F (37.4 °C)        Cognition: alert, oriented to person, place, and time  Appearance: appropriate, no acute distress, does not appear intoxicated or in withdrawal  Memory: Normal  Behavioral/motor: normal  Affect: congruent  Attitude toward examiner: respectful, pleasant  Thought content: no delusions, hallucination, Denies suicidal ideation or intent  Insight: fair  Judgement: fair  Eyes: pupils normal  Skin: no rashes, no track marks noted        There is no problem list on file for this patient. PDMP Monitoring:    Last PDMP Uri James as Reviewed [facility]):  Review User Review Instant Review Result   Jaya Denneydewey 10/7/2022 10:20 AM Reviewed PDMP [1]           I reviewed the [place] Automated Rx Reporting System report     There does not appear to be any discrepancies or overprescribing of controlled substances    GOAL:  To enhance patient recovery through the use of medication assisted treatment to improve overall quality of life. OBJECTIVE:  Patient will abstain from the use of mood altering substances 7 out of 7 days per week. INTERVENTION:  Patient will be maintained on suboxone medication.   The importance of combining medical assisted treatment with comprehensive treatment including counseling, support groups, and psychiatry as applicable was discussed with patient    Plan:   Orders Placed This Encounter   Procedures    POCT Rapid Drug Screen        CAREN Phillips CNP 10/07/22 10:21 AM

## 2022-10-25 DIAGNOSIS — F32.A DEPRESSION, UNSPECIFIED DEPRESSION TYPE: ICD-10-CM

## 2022-10-25 RX ORDER — QUETIAPINE FUMARATE 100 MG/1
100 TABLET, FILM COATED ORAL DAILY
Qty: 30 TABLET | Refills: 3 | Status: SHIPPED | OUTPATIENT
Start: 2022-10-25 | End: 2022-11-24

## 2022-10-25 RX ORDER — SERTRALINE HYDROCHLORIDE 100 MG/1
100 TABLET, FILM COATED ORAL DAILY
Qty: 30 TABLET | Refills: 3 | Status: SHIPPED | OUTPATIENT
Start: 2022-10-25 | End: 2022-11-07 | Stop reason: ALTCHOICE

## 2022-11-07 ENCOUNTER — OFFICE VISIT (OUTPATIENT)
Dept: INTERNAL MEDICINE CLINIC | Age: 32
End: 2022-11-07

## 2022-11-07 VITALS
RESPIRATION RATE: 16 BRPM | WEIGHT: 152 LBS | HEIGHT: 71 IN | SYSTOLIC BLOOD PRESSURE: 112 MMHG | BODY MASS INDEX: 21.28 KG/M2 | HEART RATE: 75 BPM | DIASTOLIC BLOOD PRESSURE: 85 MMHG | TEMPERATURE: 98.6 F

## 2022-11-07 DIAGNOSIS — F11.20 SEVERE OPIOID USE DISORDER (HCC): Primary | ICD-10-CM

## 2022-11-07 PROCEDURE — 99214 OFFICE O/P EST MOD 30 MIN: CPT | Performed by: NURSE PRACTITIONER

## 2022-11-07 PROCEDURE — 80305 DRUG TEST PRSMV DIR OPT OBS: CPT | Performed by: NURSE PRACTITIONER

## 2022-11-07 RX ORDER — BUPRENORPHINE HYDROCHLORIDE, NALOXONE HYDROCHLORIDE 12; 3 MG/1; MG/1
1 FILM, SOLUBLE BUCCAL; SUBLINGUAL DAILY
Qty: 28 FILM | Refills: 0 | Status: SHIPPED | OUTPATIENT
Start: 2022-11-07 | End: 2022-12-05

## 2022-11-07 RX ORDER — SERTRALINE HYDROCHLORIDE 100 MG/1
200 TABLET, FILM COATED ORAL DAILY
Qty: 60 TABLET | Refills: 3 | Status: SHIPPED | OUTPATIENT
Start: 2022-11-07 | End: 2022-12-07

## 2022-11-07 NOTE — PROGRESS NOTES
11/07/22   The patients primary care physician is [address] [place] [address] Berry Lund is a 28 y.o.  female who presents in office today for follow up medication assisted treatment, substance use disorder. He is established with Dr Makenna Bailey. I saw the pt on 10/7    Pt denies any urges, triggers, or cravings. UDS positive for buprenorphine and THC    Pt states feeling better on increased zoloft. I increased him to 150 mg however he has been taking 200 mg. Taking seroquel for sleep. This has improved. Averaging 8 hours and waking up twice t/o the night. Reports does not know reasoning for wake ups. He is often agitated. Denies nightmares. States typically he will get up, smoke a cigarette, and then able to fall back alseep.          Social History     Socioeconomic History    Marital status: Single     Spouse name: Not on file    Number of children: Not on file    Years of education: Not on file    Highest education level: Not on file   Occupational History    Not on file   Tobacco Use    Smoking status: Every Day     Packs/day: 0.25     Types: Cigarettes    Smokeless tobacco: Never   Substance and Sexual Activity    Alcohol use: Not Currently    Drug use: Yes     Types: Oxycodone (Oxy)     Comment: last used oxy and meth 11/28/21    Sexual activity: Not on file   Other Topics Concern    Not on file   Social History Narrative    Not on file     Social Determinants of Health     Financial Resource Strain: Not on file   Food Insecurity: Not on file   Transportation Needs: Not on file   Physical Activity: Not on file   Stress: Not on file   Social Connections: Not on file   Intimate Partner Violence: Not on file   Housing Stability: Not on file         Current Outpatient Medications on File Prior to Visit   Medication Sig Dispense Refill    QUEtiapine (SEROQUEL) 100 MG tablet Take 1 tablet by mouth daily 30 tablet 3    sertraline (ZOLOFT) 100 MG tablet Take 1 tablet by mouth daily 30 tablet 3    SUBOXONE 12-3 MG sublingual film Place 1 Film under the tongue daily for 28 days. 28 Film 0    sertraline (ZOLOFT) 50 MG tablet Take 1 tablet by mouth daily 30 tablet 3     No current facility-administered medications on file prior to visit. Vitals:    11/07/22 1428   BP: 112/85   Pulse: 75   Resp: 16   Temp: 98.6 °F (37 °C)        Cognition: alert, oriented to person, place, and time  Appearance: appropriate, no acute distress, does not appear intoxicated or in withdrawal  Memory: Normal  Behavioral/motor: normal  Affect: congruent  Attitude toward examiner: respectful, pleasant  Thought content: no delusions, hallucination, Denies suicidal ideation or intent  Insight: fair  Judgement: fair  Eyes: pupils normal  Skin: no rashes, no track marks noted        There is no problem list on file for this patient. PDMP Monitoring:    Last PDMP Frederick Alexis as Reviewed [facility]):  Review User Review Instant Review Result   Román Cohen 10/7/2022 10:20 AM Reviewed PDMP [1]           I reviewed the [place] Automated Rx Reporting System report     There does not appear to be any discrepancies or overprescribing of controlled substances    GOAL:  To enhance patient recovery through the use of medication assisted treatment to improve overall quality of life. OBJECTIVE:  Patient will abstain from the use of mood altering substances 7 out of 7 days per week. INTERVENTION:  Patient will be maintained on suboxone medication.   The importance of combining medical assisted treatment with comprehensive treatment including counseling, support groups, and psychiatry as applicable was discussed with patient    Plan:   Orders Placed This Encounter   Procedures    POCT Rapid Drug Screen        CAREN Chatman CNP 11/07/22 2:38 PM

## 2022-11-07 NOTE — PROGRESS NOTES
Verbal order per Leeanne Martinez CNP for urine drug screen. Positive for BUP THC.  Verified results with Brigido Connor RN.

## 2022-11-15 DIAGNOSIS — F11.20 SEVERE OPIOID USE DISORDER (HCC): ICD-10-CM

## 2022-11-15 RX ORDER — BUPRENORPHINE AND NALOXONE 8; 2 MG/1; MG/1
1 FILM, SOLUBLE BUCCAL; SUBLINGUAL DAILY
Qty: 28 FILM | Refills: 0 | Status: SHIPPED | OUTPATIENT
Start: 2022-11-15 | End: 2022-12-13

## 2022-11-15 NOTE — TELEPHONE ENCOUNTER
Patient called and stated he was taking extra 1/2 of his Suboxone 12mg film because he didn't have his Suboxone 8mg film. Now he will be out of his 12mg before the time is ready.

## 2022-11-15 NOTE — TELEPHONE ENCOUNTER
Patient never recevied his Suboxone 8mg film when he was here last week. I called in a verbal order per Janay Head CNP. [address] also needs a Escript sent too.

## 2022-11-18 DIAGNOSIS — F11.20 OPIOID USE DISORDER, SEVERE, DEPENDENCE (HCC): Primary | ICD-10-CM

## 2022-11-18 RX ORDER — BUPRENORPHINE AND NALOXONE 4; 1 MG/1; MG/1
1 FILM, SOLUBLE BUCCAL; SUBLINGUAL DAILY
COMMUNITY
Start: 2022-09-08 | End: 2022-11-18 | Stop reason: SDUPTHER

## 2022-11-18 RX ORDER — BUPRENORPHINE AND NALOXONE 4; 1 MG/1; MG/1
1 FILM, SOLUBLE BUCCAL; SUBLINGUAL DAILY
Qty: 28 FILM | Refills: 0 | Status: SHIPPED | OUTPATIENT
Start: 2022-11-18 | End: 2022-12-16

## 2022-11-18 NOTE — TELEPHONE ENCOUNTER
Prescription called in by attending provider, Dr Emanuel Rendon who is licensed to prescribe in [place].

## 2022-12-09 ENCOUNTER — OFFICE VISIT (OUTPATIENT)
Dept: INTERNAL MEDICINE CLINIC | Age: 32
End: 2022-12-09

## 2022-12-09 VITALS
BODY MASS INDEX: 20.47 KG/M2 | TEMPERATURE: 98 F | RESPIRATION RATE: 20 BRPM | DIASTOLIC BLOOD PRESSURE: 64 MMHG | HEIGHT: 71 IN | SYSTOLIC BLOOD PRESSURE: 112 MMHG | WEIGHT: 146.2 LBS | HEART RATE: 69 BPM

## 2022-12-09 DIAGNOSIS — F11.20 SEVERE OPIOID USE DISORDER (HCC): ICD-10-CM

## 2022-12-09 DIAGNOSIS — F11.20 OPIOID USE DISORDER, SEVERE, DEPENDENCE (HCC): Primary | ICD-10-CM

## 2022-12-09 RX ORDER — BUPRENORPHINE AND NALOXONE 12; 3 MG/1; MG/1
1 FILM, SOLUBLE BUCCAL; SUBLINGUAL DAILY
Qty: 28 FILM | Refills: 0 | Status: SHIPPED | OUTPATIENT
Start: 2022-12-09 | End: 2023-01-06

## 2022-12-09 RX ORDER — BUPRENORPHINE AND NALOXONE 4; 1 MG/1; MG/1
1 FILM, SOLUBLE BUCCAL; SUBLINGUAL DAILY
Qty: 28 FILM | Refills: 0 | Status: CANCELLED | OUTPATIENT
Start: 2022-12-09 | End: 2023-01-06

## 2022-12-09 RX ORDER — BUPRENORPHINE AND NALOXONE 8; 2 MG/1; MG/1
1 FILM, SOLUBLE BUCCAL; SUBLINGUAL DAILY
Qty: 28 FILM | Refills: 0 | Status: SHIPPED | OUTPATIENT
Start: 2022-12-09 | End: 2023-01-06

## 2022-12-09 NOTE — PROGRESS NOTES
Verbal order per Ghulam Car CNP for urine drug screen. Positive for BUP and THC. Verified results with Jane CHAVEZ LPN.

## 2022-12-09 NOTE — PROGRESS NOTES
12/09/22   The patients primary care physician is [address] [place] [address] Camilo Khalil is a 28 y.o.  male who presents in office today for follow up medication assisted treatment, substance use disorder. He is established with Dr Doreen Pelayo. I saw the pt for the first time on 10/7    Pt denies any urges or cravings. Stable on suboxone 20mg daily    UDS Positive for BUP and THC    Feeling better with recent increase in zoloft. His uncle passed away last night- pt has family support- does not feel this will trigger anything for him- will call office with any concerns        Social History     Socioeconomic History    Marital status: Single     Spouse name: Not on file    Number of children: Not on file    Years of education: Not on file    Highest education level: Not on file   Occupational History    Not on file   Tobacco Use    Smoking status: Every Day     Packs/day: 0.25     Types: Cigarettes    Smokeless tobacco: Never   Substance and Sexual Activity    Alcohol use: Not Currently    Drug use: Yes     Types: Oxycodone (Oxy)     Comment: last used oxy and meth 11/28/21    Sexual activity: Not on file   Other Topics Concern    Not on file   Social History Narrative    Not on file     Social Determinants of Health     Financial Resource Strain: Not on file   Food Insecurity: Not on file   Transportation Needs: Not on file   Physical Activity: Not on file   Stress: Not on file   Social Connections: Not on file   Intimate Partner Violence: Not on file   Housing Stability: Not on file         Current Outpatient Medications on File Prior to Visit   Medication Sig Dispense Refill    buprenorphine-naloxone (SUBOXONE) 4-1 MG FILM SL film Place 1 Film under the tongue daily for 28 days. 28 Film 0    buprenorphine-naloxone (SUBOXONE) 8-2 MG FILM SL film Place 1 Film under the tongue daily for 28 days.  28 Film 0    sertraline (ZOLOFT) 100 MG tablet Take 2 tablets by mouth daily 60 tablet 3    QUEtiapine (SEROQUEL) 100 MG tablet Take 1 tablet by mouth daily 30 tablet 3     No current facility-administered medications on file prior to visit. Vitals:    12/09/22 1038   BP: 112/64   Pulse: 69   Resp: 20   Temp: 98 °F (36.7 °C)        Cognition: alert, oriented to person, place, and time  Appearance: appropriate, no acute distress, does not appear intoxicated or in withdrawal  Memory: Normal  Behavioral/motor: normal  Affect: congruent  Attitude toward examiner: respectful, pleasant  Thought content: no delusions, hallucination, Denies suicidal ideation or intent  Insight: fair  Judgement: fair  Eyes: pupils normal  Skin: no rashes, no track marks noted        There is no problem list on file for this patient. PDMP Monitoring:    Last PDMP Norah Outlaw as Reviewed [facility]):  Review User Review Instant Review Result   magui Hawkmariely 10/7/2022 10:20 AM Reviewed PDMP [1]           I reviewed the [place] Automated Rx Reporting System report     There does not appear to be any discrepancies or overprescribing of controlled substances    GOAL:  To enhance patient recovery through the use of medication assisted treatment to improve overall quality of life. OBJECTIVE:  Patient will abstain from the use of mood altering substances 7 out of 7 days per week. INTERVENTION:  Patient will be maintained on suboxone medication.   The importance of combining medical assisted treatment with comprehensive treatment including counseling, support groups, and psychiatry as applicable was discussed with patient    Plan:   Orders Placed This Encounter   Procedures    POCT Rapid Drug Screen        CAREN Cassidy CNP 12/09/22 10:46 AM

## 2022-12-19 ENCOUNTER — TELEPHONE (OUTPATIENT)
Dept: INTERNAL MEDICINE CLINIC | Age: 32
End: 2022-12-19

## 2022-12-19 NOTE — TELEPHONE ENCOUNTER
Patient called and stated that they refilled his Suboxone 12mg films but not his 8mg films. Called [facility] and spoke with Joetta Schilder, she stated they have the script it was just refill too soon. They will get the Suboxone 8mg films ready today. Tried to call patient to let him know, mailbox not set up yet.

## 2023-01-05 ENCOUNTER — OFFICE VISIT (OUTPATIENT)
Dept: INTERNAL MEDICINE CLINIC | Age: 33
End: 2023-01-05

## 2023-01-05 VITALS
RESPIRATION RATE: 16 BRPM | DIASTOLIC BLOOD PRESSURE: 57 MMHG | HEART RATE: 60 BPM | BODY MASS INDEX: 20.5 KG/M2 | WEIGHT: 147 LBS | SYSTOLIC BLOOD PRESSURE: 101 MMHG

## 2023-01-05 DIAGNOSIS — F11.20 OPIOID USE DISORDER, SEVERE, DEPENDENCE (HCC): Primary | ICD-10-CM

## 2023-01-05 DIAGNOSIS — F11.20 SEVERE OPIOID USE DISORDER (HCC): ICD-10-CM

## 2023-01-05 DIAGNOSIS — F32.A DEPRESSION, UNSPECIFIED DEPRESSION TYPE: ICD-10-CM

## 2023-01-05 RX ORDER — BUPRENORPHINE AND NALOXONE 12; 3 MG/1; MG/1
1 FILM, SOLUBLE BUCCAL; SUBLINGUAL DAILY
Qty: 21 FILM | Refills: 0 | Status: SHIPPED | OUTPATIENT
Start: 2023-01-05 | End: 2023-01-26

## 2023-01-05 RX ORDER — QUETIAPINE FUMARATE 100 MG/1
200 TABLET, FILM COATED ORAL DAILY
Qty: 60 TABLET | Refills: 3 | Status: SHIPPED | OUTPATIENT
Start: 2023-01-05 | End: 2023-02-04

## 2023-01-05 RX ORDER — QUETIAPINE FUMARATE 100 MG/1
100 TABLET, FILM COATED ORAL DAILY
Qty: 30 TABLET | Refills: 3 | Status: SHIPPED | OUTPATIENT
Start: 2023-01-05 | End: 2023-01-05

## 2023-01-05 RX ORDER — BUPRENORPHINE AND NALOXONE 8; 2 MG/1; MG/1
1 FILM, SOLUBLE BUCCAL; SUBLINGUAL DAILY
Qty: 28 FILM | Refills: 0 | Status: CANCELLED | OUTPATIENT
Start: 2023-01-05 | End: 2023-02-02

## 2023-01-05 NOTE — PROGRESS NOTES
Verbal order per Amisha Craig CNP for urine drug screen. Positive for BUP THC. Verified results with Jane MCCLAIN LPN.

## 2023-01-05 NOTE — PROGRESS NOTES
01/05/23   The patients primary care physician is On File Not (Inactive)    Bobbi Rey is a 28 y.o.  male who presents in office today for follow up medication assisted treatment, substance use disorder. Pt was established with Dr Libia Ashton. Reports he has been clean from opiates since 1/2019    Pt denies any urges, triggers, or cravings. UDS acceptable, buprenorphine and THC    Pt states worsening sleep, only sleeping 3 hours a night. He does reports increased stressors, recent death of his uncle, he is taking care of his mom and feels adjusting to life outside of shelter is scary.   -will increase seroquel  Denies nightmares            Social History     Socioeconomic History    Marital status: Single     Spouse name: Not on file    Number of children: Not on file    Years of education: Not on file    Highest education level: Not on file   Occupational History    Not on file   Tobacco Use    Smoking status: Every Day     Packs/day: 0.25     Types: Cigarettes    Smokeless tobacco: Never   Substance and Sexual Activity    Alcohol use: Not Currently    Drug use: Yes     Types: Oxycodone (Oxy)     Comment: last used oxy and meth 11/28/21    Sexual activity: Not on file   Other Topics Concern    Not on file   Social History Narrative    Not on file     Social Determinants of Health     Financial Resource Strain: Not on file   Food Insecurity: Not on file   Transportation Needs: Not on file   Physical Activity: Not on file   Stress: Not on file   Social Connections: Not on file   Intimate Partner Violence: Not on file   Housing Stability: Not on file         Current Outpatient Medications on File Prior to Visit   Medication Sig Dispense Refill    buprenorphine-naloxone (SUBOXONE) 8-2 MG FILM SL film Place 1 Film under the tongue daily for 28 days. 28 Film 0    buprenorphine-naloxone (SUBOXONE) 12-3 MG sublingual film Place 1 Film under the tongue daily for 28 days.  28 Film 0    sertraline (ZOLOFT) 100 MG tablet Take 2 tablets by mouth daily 60 tablet 3    QUEtiapine (SEROQUEL) 100 MG tablet Take 1 tablet by mouth daily 30 tablet 3     No current facility-administered medications on file prior to visit. Vitals:    01/05/23 1045   BP: (!) 101/57   Pulse: 60   Resp: 16   Temp: (!) 60.8 °F (16 °C)        Cognition: alert, oriented to person, place, and time  Appearance: appropriate, no acute distress, does not appear intoxicated or in withdrawal  Memory: Normal  Behavioral/motor: normal  Affect: congruent  Attitude toward examiner: respectful, pleasant  Thought content: no delusions, hallucination, Denies suicidal ideation or intent  Insight: fair  Judgement: fair  Eyes: pupils normal  Skin: no rashes, no track marks noted        There is no problem list on file for this patient. PDMP Monitoring:    Last PDMP Nonnie Kocher as Reviewed [facility]):  Review User Review Instant Review Result   Winferd Anchors 1/5/2023 10:47 AM Reviewed PDMP [1]           I reviewed the [address] [place] [address] Automated Rx Reporting System report     There does not appear to be any discrepancies or overprescribing of controlled substances    GOAL:  To enhance patient recovery through the use of medication assisted treatment to improve overall quality of life. OBJECTIVE:  Patient will abstain from the use of mood altering substances 7 out of 7 days per week. INTERVENTION:  Patient will be maintained on suboxone medication.   The importance of combining medical assisted treatment with comprehensive treatment including counseling, support groups, and psychiatry as applicable was discussed with patient    Plan:   Orders Placed This Encounter   Procedures    POCT Rapid Drug Screen        CAREN Gan CNP 01/05/23 10:47 AM

## 2023-01-26 ENCOUNTER — OFFICE VISIT (OUTPATIENT)
Dept: INTERNAL MEDICINE CLINIC | Age: 33
End: 2023-01-26

## 2023-01-26 VITALS
BODY MASS INDEX: 21 KG/M2 | WEIGHT: 150 LBS | HEART RATE: 55 BPM | SYSTOLIC BLOOD PRESSURE: 100 MMHG | RESPIRATION RATE: 16 BRPM | HEIGHT: 71 IN | DIASTOLIC BLOOD PRESSURE: 52 MMHG

## 2023-01-26 DIAGNOSIS — F11.20 OPIOID USE DISORDER, SEVERE, DEPENDENCE (HCC): Primary | ICD-10-CM

## 2023-01-26 RX ORDER — BUPRENORPHINE AND NALOXONE 8; 2 MG/1; MG/1
1 FILM, SOLUBLE BUCCAL; SUBLINGUAL DAILY
Qty: 28 FILM | Refills: 0 | Status: SHIPPED | OUTPATIENT
Start: 2023-01-26 | End: 2023-02-23

## 2023-01-26 RX ORDER — BUPRENORPHINE AND NALOXONE 12; 3 MG/1; MG/1
1 FILM, SOLUBLE BUCCAL; SUBLINGUAL DAILY
Qty: 28 FILM | Refills: 0 | Status: SHIPPED | OUTPATIENT
Start: 2023-01-26 | End: 2023-02-23

## 2023-01-26 RX ORDER — SERTRALINE HYDROCHLORIDE 100 MG/1
200 TABLET, FILM COATED ORAL DAILY
Qty: 60 TABLET | Refills: 3 | Status: SHIPPED | OUTPATIENT
Start: 2023-01-26 | End: 2023-02-25

## 2023-01-26 NOTE — PROGRESS NOTES
01/26/23   The patients primary care physician is On File Not (Inactive)    Roro Gonzales is a 28 y.o.  male who presents in office today for follow up medication assisted treatment, substance use disorder. Pt was established with Dr Andressa Benson 11/30/2021  Reports he has been clean from opiates since 1/2019    Pt denies any urges, triggers, or cravings.      UDS Positive for BUP, THC    Improved sleep with seroquel  Mood stable with zoloft  Pertinent Drug History  Patient started using heroin and fentanyl 13 years ago  He started getting pain pills done in [facility] then progressed to heroin  Patient uses it intravenous  Other drugs used: Meth, THC  Suboxone programs in the past he was in one in [facility] and one in [place]  He was clean for about 2 and half years combined but he did relapse in between  He went to assisted December 2019  Patient would typically use up to 3 g daily fentanyl  overdosed 4 times, one time he had to be given Narcan        Social History     Socioeconomic History    Marital status: Single     Spouse name: Not on file    Number of children: Not on file    Years of education: Not on file    Highest education level: Not on file   Occupational History    Not on file   Tobacco Use    Smoking status: Every Day     Packs/day: 0.25     Types: Cigarettes    Smokeless tobacco: Never   Substance and Sexual Activity    Alcohol use: Not Currently    Drug use: Yes     Types: Oxycodone (Oxy)     Comment: last used oxy and meth 11/28/21    Sexual activity: Not on file   Other Topics Concern    Not on file   Social History Narrative    Not on file     Social Determinants of Health     Financial Resource Strain: Not on file   Food Insecurity: Not on file   Transportation Needs: Not on file   Physical Activity: Not on file   Stress: Not on file   Social Connections: Not on file   Intimate Partner Violence: Not on file   Housing Stability: Not on file         Current Outpatient Medications on File Prior to Visit   Medication Sig Dispense Refill    buprenorphine-naloxone (SUBOXONE) 12-3 MG sublingual film Place 1 Film under the tongue daily for 21 days. Max Daily Amount: 1 Film 21 Film 0    QUEtiapine (SEROQUEL) 100 MG tablet Take 2 tablets by mouth daily 60 tablet 3    sertraline (ZOLOFT) 100 MG tablet Take 2 tablets by mouth daily 60 tablet 3     No current facility-administered medications on file prior to visit. Vitals:    01/26/23 1010   BP: (!) 100/52   Pulse: 55   Resp: 16        Cognition: alert, oriented to person, place, and time  Appearance: appropriate, no acute distress, does not appear intoxicated or in withdrawal  Memory: Normal  Behavioral/motor: normal  Affect: congruent  Attitude toward examiner: respectful, pleasant  Thought content: no delusions, hallucination, Denies suicidal ideation or intent  Insight: fair  Judgement: fair  Eyes: pupils normal  Skin: no rashes, no track marks noted        There is no problem list on file for this patient. PDMP Monitoring:    Last PDMP Ko Schmidt as Reviewed [facility]):  Review User Review Instant Review Result   Rodrick Poole 1/26/2023 10:36 AM Reviewed PDMP [1]           I reviewed the [place] Automated Rx Reporting System report     There does not appear to be any discrepancies or overprescribing of controlled substances    GOAL:  To enhance patient recovery through the use of medication assisted treatment to improve overall quality of life. OBJECTIVE:  Patient will abstain from the use of mood altering substances 7 out of 7 days per week. INTERVENTION:  Patient will be maintained on suboxone medication.   The importance of combining medical assisted treatment with comprehensive treatment including counseling, support groups, and psychiatry as applicable was discussed with patient      Orders Placed This Encounter   Procedures    POCT Rapid Drug Screen        CAREN Viveros CNP 01/26/23 10:36 AM

## 2023-01-26 NOTE — PROGRESS NOTES
Verbal order per Evan Tillman CNP for urine drug screen. Positive for BUP, THC. Verified results with Michaela Sims LPN.

## 2023-02-23 ENCOUNTER — OFFICE VISIT (OUTPATIENT)
Dept: INTERNAL MEDICINE CLINIC | Age: 33
End: 2023-02-23

## 2023-02-23 VITALS
RESPIRATION RATE: 16 BRPM | HEART RATE: 73 BPM | DIASTOLIC BLOOD PRESSURE: 46 MMHG | WEIGHT: 151 LBS | SYSTOLIC BLOOD PRESSURE: 108 MMHG | BODY MASS INDEX: 21.06 KG/M2

## 2023-02-23 DIAGNOSIS — F11.20 OPIOID USE DISORDER, SEVERE, DEPENDENCE (HCC): Primary | ICD-10-CM

## 2023-02-23 RX ORDER — BUPRENORPHINE AND NALOXONE 8; 2 MG/1; MG/1
1 FILM, SOLUBLE BUCCAL; SUBLINGUAL DAILY
Qty: 28 FILM | Refills: 0 | Status: SHIPPED | OUTPATIENT
Start: 2023-02-23 | End: 2023-03-23

## 2023-02-23 RX ORDER — BUPRENORPHINE AND NALOXONE 12; 3 MG/1; MG/1
1 FILM, SOLUBLE BUCCAL; SUBLINGUAL DAILY
Qty: 28 FILM | Refills: 0 | Status: SHIPPED | OUTPATIENT
Start: 2023-02-23 | End: 2023-03-23

## 2023-02-23 NOTE — PROGRESS NOTES
02/23/23   The patients primary care physician is On File Not (Inactive)    Vikash Avila is a 28 y.o.  male who presents in office today for follow up medication assisted treatment, substance use disorder. Pt was established with Dr Makenna Bailey 11/30/2021  Reports he has been clean from opiates since 1/2019     Pt denies any urges, triggers, or cravings. Controlled with suboxone 20mg dialy     UDS Positive for BUP, THC    Reports zoloft and seroquel managing sleep and mood well.  Denies need for medication modification      Pertinent Drug History  Patient started using heroin and fentanyl 13 years ago  He started getting pain pills done in [facility] then progressed to heroin  Patient uses it intravenous  Other drugs used: Meth, THC  Suboxone programs in the past he was in one in [facility] and one in [place]  He was clean for about 2 and half years combined but he did relapse in between  He went to half[place]way December 2019  Patient would typically use up to 3 g daily fentanyl  overdosed 4 times, one time he had to be given Narcan    Social History     Socioeconomic History    Marital status: Single     Spouse name: Not on file    Number of children: Not on file    Years of education: Not on file    Highest education level: Not on file   Occupational History    Not on file   Tobacco Use    Smoking status: Every Day     Packs/day: 0.25     Types: Cigarettes    Smokeless tobacco: Never   Substance and Sexual Activity    Alcohol use: Not Currently    Drug use: Yes     Types: Oxycodone (Oxy)     Comment: last used oxy and meth 11/28/21    Sexual activity: Not on file   Other Topics Concern    Not on file   Social History Narrative    Not on file     Social Determinants of Health     Financial Resource Strain: Not on file   Food Insecurity: Not on file   Transportation Needs: Not on file   Physical Activity: Not on file   Stress: Not on file   Social Connections: Not on file   Intimate Partner Violence: Not on file Housing Stability: Not on file         Current Outpatient Medications on File Prior to Visit   Medication Sig Dispense Refill    buprenorphine-naloxone (SUBOXONE) 12-3 MG sublingual film Place 1 Film under the tongue daily for 28 days. Max Daily Amount: 1 Film 28 Film 0    sertraline (ZOLOFT) 100 MG tablet Take 2 tablets by mouth daily 60 tablet 3    buprenorphine-naloxone (SUBOXONE) 8-2 MG FILM SL film Place 1 Film under the tongue daily for 28 days. 28 Film 0    QUEtiapine (SEROQUEL) 100 MG tablet Take 2 tablets by mouth daily 60 tablet 3     No current facility-administered medications on file prior to visit. Vitals:    02/23/23 1012   BP: (!) 108/46   Pulse: 73   Resp: 16        Cognition: alert, oriented to person, place, and time  Appearance: appropriate, no acute distress, does not appear intoxicated or in withdrawal  Memory: Normal  Behavioral/motor: normal  Affect: congruent  Attitude toward examiner: respectful, pleasant  Thought content: no delusions, hallucination, Denies suicidal ideation or intent  Insight: fair  Judgement: fair  Eyes: pupils normal  Skin: no rashes, no track marks noted        There is no problem list on file for this patient. PDMP Monitoring:    Last PDMP Alyson Thapa as Reviewed [facility]):  Review User Review Instant Review Result   Margaret Merrill 1/26/2023 10:36 AM Reviewed PDMP [1]           I reviewed the [place] Automated Rx Reporting System report     There does not appear to be any discrepancies or overprescribing of controlled substances    GOAL:  To enhance patient recovery through the use of medication assisted treatment to improve overall quality of life. OBJECTIVE:  Patient will abstain from the use of mood altering substances 7 out of 7 days per week. INTERVENTION:  Patient will be maintained on suboxone medication.   The importance of combining medical assisted treatment with comprehensive treatment including counseling, support groups, and psychiatry as applicable was discussed with patient      Orders Placed This Encounter   Procedures    POCT 1823 CAREN Soliz CNP 02/23/23 10:18 AM

## 2023-03-23 RX ORDER — SERTRALINE HYDROCHLORIDE 100 MG/1
200 TABLET, FILM COATED ORAL DAILY
Qty: 60 TABLET | Refills: 0 | Status: SHIPPED | OUTPATIENT
Start: 2023-03-23 | End: 2023-04-22

## 2023-03-27 ENCOUNTER — OFFICE VISIT (OUTPATIENT)
Dept: INTERNAL MEDICINE CLINIC | Age: 33
End: 2023-03-27
Payer: COMMERCIAL

## 2023-03-27 VITALS
BODY MASS INDEX: 20.08 KG/M2 | RESPIRATION RATE: 16 BRPM | DIASTOLIC BLOOD PRESSURE: 66 MMHG | HEART RATE: 82 BPM | WEIGHT: 144 LBS | SYSTOLIC BLOOD PRESSURE: 114 MMHG

## 2023-03-27 DIAGNOSIS — F11.20 OPIOID USE DISORDER, SEVERE, DEPENDENCE (HCC): ICD-10-CM

## 2023-03-27 DIAGNOSIS — F32.A DEPRESSION, UNSPECIFIED DEPRESSION TYPE: ICD-10-CM

## 2023-03-27 DIAGNOSIS — F11.20 SEVERE OPIOID USE DISORDER (HCC): Primary | ICD-10-CM

## 2023-03-27 PROCEDURE — 99214 OFFICE O/P EST MOD 30 MIN: CPT | Performed by: NURSE PRACTITIONER

## 2023-03-27 PROCEDURE — 80305 DRUG TEST PRSMV DIR OPT OBS: CPT | Performed by: NURSE PRACTITIONER

## 2023-03-27 RX ORDER — BUPRENORPHINE AND NALOXONE 12; 3 MG/1; MG/1
1 FILM, SOLUBLE BUCCAL; SUBLINGUAL DAILY
Qty: 28 FILM | Refills: 0 | Status: SHIPPED | OUTPATIENT
Start: 2023-03-27 | End: 2023-04-24

## 2023-03-27 RX ORDER — BUPRENORPHINE AND NALOXONE 8; 2 MG/1; MG/1
1 FILM, SOLUBLE BUCCAL; SUBLINGUAL DAILY
Qty: 28 FILM | Refills: 0 | Status: SHIPPED | OUTPATIENT
Start: 2023-03-27 | End: 2023-04-24

## 2023-03-27 RX ORDER — QUETIAPINE FUMARATE 100 MG/1
200 TABLET, FILM COATED ORAL DAILY
Qty: 60 TABLET | Refills: 2 | Status: SHIPPED | OUTPATIENT
Start: 2023-03-27 | End: 2023-04-26

## 2023-03-27 NOTE — PROGRESS NOTES
03/27/23   The patients primary care physician is On File Not (Inactive)    Michael Rajan is a 28 y.o.  male who presents in office today for follow up medication assisted treatment, substance use disorder. Pt was established with Dr Cas David 11/30/2021  Reports he has been clean from opiates since 1/2019    Increased stress- neighbors complaining of smelling marijuana-police came and searched the home. Police are familiar with pt due to his criminal history however he is working and has been productive to society. Pt states he is doing well and will not allow them to relapse. Pt states the police are threatening charges on him if he doesn't cooperate with them in regards to snitching on other people. States they found suboxone not in original containers so state they can charge him with this also. Pt states his anxiety has been elevated and he is unsure what to do. UDS acceptable    Continues to take all medications as prescribed.      Pertinent Drug History  Patient started using heroin and fentanyl 13 years ago  He started getting pain pills done in [facility] then progressed to heroin  Patient uses it intravenous  Other drugs used: Meth, THC  Suboxone programs in the past he was in one in [facility] and one in [place]  He was clean for about 2 and half years combined but he did relapse in between  He went to retirement December 2019  Patient would typically use up to 3 g daily fentanyl  overdosed 4 times, one time he had to be given Narcan        Social History     Socioeconomic History    Marital status: Single     Spouse name: Not on file    Number of children: Not on file    Years of education: Not on file    Highest education level: Not on file   Occupational History    Not on file   Tobacco Use    Smoking status: Every Day     Packs/day: 0.25     Types: Cigarettes    Smokeless tobacco: Never   Substance and Sexual Activity    Alcohol use: Not Currently    Drug use: Yes     Types: Oxycodone (Oxy)

## 2023-04-17 ENCOUNTER — TELEPHONE (OUTPATIENT)
Dept: INTERNAL MEDICINE CLINIC | Age: 33
End: 2023-04-17

## 2023-04-26 ENCOUNTER — OFFICE VISIT (OUTPATIENT)
Dept: INTERNAL MEDICINE CLINIC | Age: 33
End: 2023-04-26
Payer: COMMERCIAL

## 2023-04-26 VITALS
HEIGHT: 71 IN | SYSTOLIC BLOOD PRESSURE: 144 MMHG | WEIGHT: 144 LBS | DIASTOLIC BLOOD PRESSURE: 86 MMHG | BODY MASS INDEX: 20.16 KG/M2 | HEART RATE: 79 BPM | RESPIRATION RATE: 16 BRPM

## 2023-04-26 DIAGNOSIS — F41.9 ANXIETY: ICD-10-CM

## 2023-04-26 DIAGNOSIS — F11.20 SEVERE OPIOID USE DISORDER (HCC): Primary | ICD-10-CM

## 2023-04-26 PROCEDURE — 80305 DRUG TEST PRSMV DIR OPT OBS: CPT | Performed by: NURSE PRACTITIONER

## 2023-04-26 PROCEDURE — 99214 OFFICE O/P EST MOD 30 MIN: CPT | Performed by: NURSE PRACTITIONER

## 2023-04-26 RX ORDER — BUPRENORPHINE AND NALOXONE 8; 2 MG/1; MG/1
1 FILM, SOLUBLE BUCCAL; SUBLINGUAL DAILY
Qty: 28 FILM | Refills: 0 | Status: SHIPPED | OUTPATIENT
Start: 2023-04-26 | End: 2023-05-24

## 2023-04-26 RX ORDER — BUSPIRONE HYDROCHLORIDE 7.5 MG/1
7.5 TABLET ORAL 2 TIMES DAILY
Qty: 60 TABLET | Refills: 0 | Status: SHIPPED | OUTPATIENT
Start: 2023-04-26 | End: 2023-05-26

## 2023-04-26 RX ORDER — SERTRALINE HYDROCHLORIDE 100 MG/1
200 TABLET, FILM COATED ORAL DAILY
Qty: 60 TABLET | Refills: 0 | Status: SHIPPED | OUTPATIENT
Start: 2023-04-26 | End: 2023-05-26

## 2023-04-26 RX ORDER — BUPRENORPHINE AND NALOXONE 12; 3 MG/1; MG/1
1 FILM, SOLUBLE BUCCAL; SUBLINGUAL DAILY
Qty: 28 FILM | Refills: 0 | Status: SHIPPED | OUTPATIENT
Start: 2023-04-26 | End: 2023-05-24

## 2023-04-26 NOTE — PROGRESS NOTES
Verbal order per Bharath Gardiner CNP for urine drug screen. Positive for BUP THC. Verified results with Jane MCCLAIN LPN.
Placed This Encounter   Procedures    POCT Rapid Drug Screen        Gwenda Apley, APRN - CNP 04/26/23 9:15 AM

## 2023-06-05 ENCOUNTER — OFFICE VISIT (OUTPATIENT)
Dept: INTERNAL MEDICINE CLINIC | Age: 33
End: 2023-06-05

## 2023-06-05 VITALS
HEART RATE: 98 BPM | BODY MASS INDEX: 18.9 KG/M2 | DIASTOLIC BLOOD PRESSURE: 89 MMHG | SYSTOLIC BLOOD PRESSURE: 142 MMHG | RESPIRATION RATE: 16 BRPM | HEIGHT: 71 IN | WEIGHT: 135 LBS

## 2023-06-05 DIAGNOSIS — F11.20 SEVERE OPIOID USE DISORDER (HCC): Primary | ICD-10-CM

## 2023-06-05 DIAGNOSIS — F32.A DEPRESSION, UNSPECIFIED DEPRESSION TYPE: ICD-10-CM

## 2023-06-05 RX ORDER — BUPRENORPHINE AND NALOXONE 12; 3 MG/1; MG/1
1 FILM, SOLUBLE BUCCAL; SUBLINGUAL DAILY
Qty: 7 FILM | Refills: 0 | Status: SHIPPED | OUTPATIENT
Start: 2023-06-05 | End: 2023-06-12

## 2023-06-05 RX ORDER — BUSPIRONE HYDROCHLORIDE 7.5 MG/1
7.5 TABLET ORAL 2 TIMES DAILY
Qty: 60 TABLET | Refills: 0 | Status: SHIPPED | OUTPATIENT
Start: 2023-06-05 | End: 2023-07-05

## 2023-06-05 RX ORDER — BUPRENORPHINE AND NALOXONE 8; 2 MG/1; MG/1
1 FILM, SOLUBLE BUCCAL; SUBLINGUAL DAILY
Qty: 7 FILM | Refills: 0 | Status: SHIPPED | OUTPATIENT
Start: 2023-06-05 | End: 2023-06-12

## 2023-06-05 RX ORDER — SERTRALINE HYDROCHLORIDE 100 MG/1
200 TABLET, FILM COATED ORAL DAILY
Qty: 60 TABLET | Refills: 0 | Status: SHIPPED | OUTPATIENT
Start: 2023-06-05 | End: 2023-07-05

## 2023-06-05 RX ORDER — QUETIAPINE FUMARATE 100 MG/1
200 TABLET, FILM COATED ORAL DAILY
Qty: 60 TABLET | Refills: 0 | Status: SHIPPED | OUTPATIENT
Start: 2023-06-05 | End: 2023-07-05

## 2023-06-05 NOTE — PROGRESS NOTES
Verbal order per Patti Lund CNP for urine drug screen. Positive for BUP,THC. Verified results with Cachorro Alexander RN.

## 2024-04-22 NOTE — TELEPHONE ENCOUNTER
Called pt per KB to inform pt of change of appt on 04/24/2023. KB would like to do VV visit between 3:30PM-4:00PM. VM unavailable. [phone]

## 2024-05-01 ENCOUNTER — SOCIAL WORK (OUTPATIENT)
Dept: INTERNAL MEDICINE CLINIC | Age: 34
End: 2024-05-01

## 2024-05-01 NOTE — PROGRESS NOTES
Patient has moved to [place], and is currently incarcerated. Patient's mother contacted office for assistance for medical records.  Sw attempted to offer assistance and guidance in the right direction.